# Patient Record
Sex: FEMALE | Race: WHITE | NOT HISPANIC OR LATINO | Employment: FULL TIME | URBAN - NONMETROPOLITAN AREA
[De-identification: names, ages, dates, MRNs, and addresses within clinical notes are randomized per-mention and may not be internally consistent; named-entity substitution may affect disease eponyms.]

---

## 2017-04-17 ENCOUNTER — TRANSCRIBE ORDERS (OUTPATIENT)
Dept: ADMINISTRATIVE | Facility: HOSPITAL | Age: 42
End: 2017-04-17

## 2017-04-17 ENCOUNTER — LAB (OUTPATIENT)
Dept: LAB | Facility: HOSPITAL | Age: 42
End: 2017-04-17

## 2017-04-17 DIAGNOSIS — E55.9 VITAMIN D INSUFFICIENCY: ICD-10-CM

## 2017-04-17 DIAGNOSIS — E55.9 VITAMIN D INSUFFICIENCY: Primary | ICD-10-CM

## 2017-04-17 LAB — 25(OH)D3 SERPL-MCNC: 33 NG/ML

## 2017-04-17 PROCEDURE — 82306 VITAMIN D 25 HYDROXY: CPT

## 2017-04-17 PROCEDURE — 36415 COLL VENOUS BLD VENIPUNCTURE: CPT

## 2017-04-18 ENCOUNTER — HOSPITAL ENCOUNTER (OUTPATIENT)
Dept: GENERAL RADIOLOGY | Facility: HOSPITAL | Age: 42
Discharge: HOME | End: 2017-04-18
Admitting: NURSE PRACTITIONER

## 2017-04-18 ENCOUNTER — TRANSCRIBE ORDERS (OUTPATIENT)
Dept: ADMINISTRATIVE | Facility: HOSPITAL | Age: 42
End: 2017-04-18

## 2017-04-18 DIAGNOSIS — M54.2 NECK PAIN: ICD-10-CM

## 2017-04-18 DIAGNOSIS — M54.2 NECK PAIN: Primary | ICD-10-CM

## 2017-04-18 PROCEDURE — 72050 X-RAY EXAM NECK SPINE 4/5VWS: CPT

## 2017-04-18 PROCEDURE — 72050 X-RAY EXAM NECK SPINE 4/5VWS: CPT | Performed by: RADIOLOGY

## 2017-09-19 ENCOUNTER — TRANSCRIBE ORDERS (OUTPATIENT)
Dept: ADMINISTRATIVE | Facility: HOSPITAL | Age: 42
End: 2017-09-19

## 2017-09-19 ENCOUNTER — TRANSCRIBE ORDERS (OUTPATIENT)
Dept: PHYSICAL THERAPY | Facility: HOSPITAL | Age: 42
End: 2017-09-19

## 2017-09-19 DIAGNOSIS — M54.2 CERVICALGIA: Primary | ICD-10-CM

## 2017-09-19 DIAGNOSIS — M54.2 NECK PAIN: Primary | ICD-10-CM

## 2017-09-19 DIAGNOSIS — M25.521 RIGHT ELBOW PAIN: ICD-10-CM

## 2017-09-20 ENCOUNTER — HOSPITAL ENCOUNTER (OUTPATIENT)
Dept: PHYSICAL THERAPY | Facility: HOSPITAL | Age: 42
Setting detail: THERAPIES SERIES
Discharge: HOME | End: 2017-09-20

## 2017-09-20 DIAGNOSIS — M54.2 NECK PAIN: Primary | ICD-10-CM

## 2017-09-20 DIAGNOSIS — M25.521 RIGHT ELBOW PAIN: ICD-10-CM

## 2017-09-20 PROCEDURE — 97035 APP MDLTY 1+ULTRASOUND EA 15: CPT | Performed by: PHYSICAL THERAPIST

## 2017-09-20 PROCEDURE — 97162 PT EVAL MOD COMPLEX 30 MIN: CPT | Performed by: PHYSICAL THERAPIST

## 2017-09-20 NOTE — THERAPY EVALUATION
Outpatient Physical Therapy Ortho Initial Evaluation   [place]     Patient Name: Rosie Cruz  : 1975  MRN: [id number]  Today's Date: 2017      Visit Date: 2017    There is no problem list on file for this patient.       History reviewed. No pertinent past medical history.     Past Surgical History:   Procedure Laterality Date   • CHOLECYSTECTOMY     • HYSTERECTOMY         Visit Dx:     ICD-10-CM ICD-9-CM   1. Neck pain M54.2 723.1   2. Right elbow pain M25.521 719.42             Patient History       17 1500          History    Chief Complaint Pain;Joint stiffness;Muscle tenderness;Tingling;Numbness;Difficulty with daily activities  -BE      Type of Pain Elbow pain;Neck pain  -BE      Date Current Problem(s) Began --   Right elbow pain-1 year; Neck pain-7 years  -BE      Brief Description of Current Complaint Patient reports that she has been experiencing neck pain for approximately 7 years, noting that she is unsure of mechanism of injury.  Patient reports that she has recently noticed pain worsening over the past year and started noticing headaches.  Patient reports that she began noticing pain in the right elbow for approximately 1 year; she notes that she does not recall any mechanism of injury at the elbow.  Patient reports that elbow pain is primarily on the lateral aspect of the elbow.  Patient reports that she experiences numbness/tingling in the right UE, which extends to the fingertips.    -BE      Onset Date- PT 2017  -BE      Patient/Caregiver Goals Return to prior level of function;Relieve pain  -BE      Current Tobacco Use Non-smoker  -BE      Smoking Status Non-smoker  -BE      Patient's Rating of General Health Excellent  -BE      Hand Dominance right-handed  -BE      Occupation/sports/leisure activities [employer]   -BE      Patient seeing anyone else for problem(s)? Yes  -BE      How has patient tried to help current problem? Chiropractor, OTC  medication, heat (neck)  -BE      What clinical tests have you had for this problem? X-ray;Nerve Conduction Test  -BE      Results of Clinical Tests Negative for bony abnormalities at the neck; Nerve conduction test negative for carpal tunnel  -BE      Are you or can you be pregnant No  -BE      Pain     Pain Location Elbow;Neck  -BE      Pain at Present 2  -BE      Pain at Best 2  -BE      Pain at Worst 8  -BE      Pain Frequency Constant/continuous   varies in intensity  -BE      Pain Description Aching;Dull;Sharp;Stabbing;Burning;Tiring;Numbness;Throbbing  -BE      What Performance Factors Make the Current Problem(s) WORSE? Neck movements, lifting, elbow movements  -BE      What Performance Factors Make the Current Problem(s) BETTER? OTC medication  -BE      Is your sleep disturbed? Yes  -BE      Total hours of sleep per night 6-7 hours disturbed  -BE      Difficulties at work? Patient reports difficulty with moving the mouse or writing.  -BE      Difficulties with ADL's? Patient reports that she is indpendent with ADLs, but notes that she heavily relies on the unaffected UE.  -BE      Fall Risk Assessment    Any falls in the past year: No  -BE      Number of falls reported in the last 12 months 0  -BE      Daily Activities    Primary Language English  -BE      Are you able to read Yes  -BE      Are you able to write Yes  -BE      How does patient learn best? Listening;Reading;Demonstration;Pictures/Video  -BE      Pt Participated in POC and Goals Yes  -BE      Safety    Are you being hurt, hit, or frightened by anyone at home or in your life? No  -BE      Are you being neglected by a caregiver No  -BE        User Key  (r) = Recorded By, (t) = Taken By, (c) = Cosigned By    Initials Name Provider Type    BE Casi Ren, PT Physical Therapist                PT Ortho       09/20/17 1500    Quarter Clearing    Quarter Clearing Upper Quarter Clearing  -BE    DTR- Upper Quarter Clearing    Biceps (C5/6)  Bilateral:;0- No response  -BE    Brachioradialis (C6) Bilateral:;2- Normal response  -BE    Triceps (C7) Bilateral:;2- Normal response  -BE    Sensory Screen for Light Touch- Upper Quarter Clearing    C4 (posterior shoulder) Bilateral:;Intact  -BE    C5 (lateral upper arm) Bilateral:;Intact  -BE    C6 (tip of thumb) Bilateral:;Intact  -BE    C7 (tip of 3rd finger) Right:;Diminished;Left:;Intact  -BE    C8 (tip of 5th finger) Bilateral:;Intact  -BE    T1 (medial lower arm) Bilateral:;Intact  -BE    Myotomal Screen- Upper Quarter Clearing    Shoulder flexion (C5) Left:;5 (Normal);Right:;4 (Good)  -BE    Elbow flexion/wrist extension (C6) Left:;5 (Normal);Right:;4 (Good)  -BE    Elbow extension/wrist flexion (C7) Left:;5 (Normal);Right:;4 (Good)  -BE    Cervical/Shoulder ROM Screen    Cervical flexion Impaired   40  -BE    Cervical extension Impaired   30  -BE    Cervical lateral flexion Impaired   Left-33, Right-34  -BE    Cervical rotation Impaired   Left-57, Right-55  -BE    Special Tests/Palpation    Special Tests/Palpation Cervical/Thoracic  -BE    Cervical Palpation    Suboccipital Tender  -BE    SCM Tender  -BE    Cervical Facets Tender  -BE    Scalenes Tender  -BE    Spinous Process Tender  -BE    Levator Scapula Tender;Guarded/taut  -BE    Upper Traps Tender;Guarded/taut  -BE    Middle Traps Tender  -BE    Rhomboids Tender  -BE    Lower Traps Tender  -BE    Cervical/Thoracic Special Tests    Spurlings (Foraminal Compression) Negative  -BE    Cervical Compression (Forarminal Compression vs. Facet Pain) Negative  -BE    Cervical Distraction (Foraminal Compression vs. Facet Pain) Negative  -BE    Elbow/Forearm Palpation    Lateral Epicondyle Right:;Tender  -BE    Extensor Tendon Right:;Tender  -BE    Lateral Collateral Tender  -BE    Elbow/Forearm Special Tests    Valgus Stress at 30 Degrees (UCL Lesion) Negative  -BE    Varus Stress at 30 Degrees (RCL Lesion) Negative  -BE    Medial Epicondyle Test (Golfer's  Elbow) Negative  -BE    Lateral Epicondyle Test (Tennis Elbow) Positive  -BE    Left Elbow/Forearm    Extension/Flexion ROM Details 0-140  -BE    Right Elbow/Forearm    Extension/Flexion ROM Details 5-135  -BE      User Key  (r) = Recorded By, (t) = Taken By, (c) = Cosigned By    Initials Name Provider Type    BE Casi Ren, PT Physical Therapist                            Therapy Education       09/20/17 1607          Therapy Education    Given HEP;Symptoms/condition management;Pain management;Posture/body mechanics  -BE      Program New  -BE      How Provided Verbal;Demonstration  -BE      Provided to Patient  -BE      Level of Understanding Demonstrated;Verbalized  -BE        User Key  (r) = Recorded By, (t) = Taken By, (c) = Cosigned By    Initials Name Provider Type    BE Casi Ren, PT Physical Therapist                PT OP Goals       09/20/17 1600       PT Short Term Goals    STG Date to Achieve 10/04/17  -BE     STG 1 Patient will report pain no greater than 5/10 when performing self-care activities.  -BE     STG 2 Patient will report a 25% decrease in headaches.  -BE     STG 3 Cervical ROM will improve by at least 5 degrees to allow for greater ease with ADLs.  -BE     STG 4 Right Elbow ROM will be symmetrical to unaffected elbow to allow for greater ease with ADLs.  -BE     Long Term Goals    LTG Date to Achieve 10/20/17  -BE     LTG 1 Patient will report being able to achieve at least 6 hours of undisturbed sleep.  -BE     LTG 2 Cervical ROM will improve by at least 10 degrees to allow for greater ease with ADLs.  -BE     LTG 3 Patient will report pain no greater than 3/10 when lifting items with the right UE.  -BE     LTG 4 NDI scores will improve by at least 10% to show improved functional mobility.  -BE     LTG 5 UE strength will improve to at least 4+/5 on the right.  -BE     Time Calculation    PT Goal Re-Cert Due Date 10/20/17  -BE       User Key  (r) = Recorded By, (t) = Taken By,  (c) = Cosigned By    Initials Name Provider Type    BE Casi Ren, PT Physical Therapist                PT Assessment/Plan       09/20/17 1614       PT Assessment    Functional Limitations Limitation in home management;Performance in self-care ADL;Performance in leisure activities;Limitations in community activities;Performance in work activities  -BE     Impairments Muscle strength;Sensation;Pain;Joint mobility;Posture;Range of motion  -BE     Assessment Comments Patient is a 42 year old female referred to therapy with neck pain and right elbow pain.  Patient presents with decreased cervical ROM, decreased right elbow ROM, decreased UE strength, decreased sensation, and increased pain.  Patient displayed positive special tests for lateral epicondylitis.  Special tests were negative at the cervical spine.  Patient reports a 20% functional mobility impairment, based on her response to the Neck Disability Index.  Patient will benefit from skilled PT so that she can achieve her maximum level of function.  -BE     Please refer to paper survey for additional self-reported information Yes  -BE     Rehab Potential Good  -BE     Patient/caregiver participated in establishment of treatment plan and goals Yes  -BE     Patient would benefit from skilled therapy intervention Yes  -BE     PT Plan    PT Frequency 2x/week;3x/week  -BE     Predicted Duration of Therapy Intervention (days/wks) 4 weeks  -BE     Planned CPT's? PT EVAL MOD COMPLELITY: 88277;PT RE-EVAL: 89921;PT THER PROC EA 15 MIN: 75781;PT THER ACT EA 15 MIN: 87363;PT MANUAL THERAPY EA 15 MIN: 81837;PT NEUROMUSC RE-EDUCATION EA 15 MIN: 67451;PT ELECTRICAL STIM UNATTEND: ;PT ULTRASOUND EA 15 MIN: 04417;PT THER SUPP EA 15 MIN;PT HOT/COLD PACK WC NONMCARE: 66731  -BE     PT Plan Comments Above interventions to be used to improve functional mobility.  -BE       User Key  (r) = Recorded By, (t) = Taken By, (c) = Cosigned By    Initials Name Provider Type    BE  Casi Ren, PT Physical Therapist                Modalities       09/20/17 1500          Ultrasound 42731    Location Right lateral epicondyle  -BE      Rx Minutes 8  -BE      Duty Cycle 50  -BE      Frequency --   1.2 MHz  -BE      Intensity - Wts/cm 1.2  -BE        User Key  (r) = Recorded By, (t) = Taken By, (c) = Cosigned By    Initials Name Provider Type    BE Casi Ren PT Physical Therapist              Exercises       09/20/17 1600          Exercise 1    Exercise Name 1 HEP  -BE        User Key  (r) = Recorded By, (t) = Taken By, (c) = Cosigned By    Initials Name Provider Type    BE Casi Ren PT Physical Therapist                              Outcome Measures       09/20/17 1600          Neck Disability Index    Section 1 - Pain Intensity 2  -BE      Section 2 - Personal Care 1  -BE      Section 3 - Lifting 1  -BE      Section 4 - Work 0  -BE      Section 5 - Headaches 2  -BE      Section 6 - Concentration 0  -BE      Section 7 - Sleeping 1  -BE      Section 8 - Driving 1  -BE      Section 9 - Reading 1  -BE      Section 10 - Recreation 1  -BE      Neck Disability Index Score 10  -BE      Neck Disability Index Comments 10/50=20% impairment  -BE      Functional Assessment    Outcome Measure Options Neck Disability Index (NDI)  -BE        User Key  (r) = Recorded By, (t) = Taken By, (c) = Cosigned By    Initials Name Provider Type    BE Casi Ren PT Physical Therapist            Time Calculation:   Start Time: 1458  Stop Time: 1615  Time Calculation (min): 77 min     Therapy Charges for Today     Code Description Service Date Service Provider Modifiers Qty    11308198363  PT EVAL MOD COMPLEXITY 4 9/20/2017 Casi Ren, PT GP 1    31908147045  PT ULTRASOUND EA 15 MIN 9/20/2017 Casi Ren, PT GP 1          PT G-Codes  Outcome Measure Options: Neck Disability Index (NDI)         Casi Ren PT  9/20/2017

## 2017-09-22 ENCOUNTER — HOSPITAL ENCOUNTER (OUTPATIENT)
Dept: MRI IMAGING | Facility: HOSPITAL | Age: 42
Discharge: HOME | End: 2017-09-22
Admitting: NURSE PRACTITIONER

## 2017-09-22 DIAGNOSIS — M54.2 CERVICALGIA: ICD-10-CM

## 2017-09-22 PROCEDURE — 72141 MRI NECK SPINE W/O DYE: CPT

## 2017-09-22 PROCEDURE — 72141 MRI NECK SPINE W/O DYE: CPT | Performed by: RADIOLOGY

## 2017-09-25 ENCOUNTER — HOSPITAL ENCOUNTER (OUTPATIENT)
Dept: PHYSICAL THERAPY | Facility: HOSPITAL | Age: 42
Setting detail: THERAPIES SERIES
Discharge: HOME | End: 2017-09-25

## 2017-09-25 DIAGNOSIS — M54.2 NECK PAIN: Primary | ICD-10-CM

## 2017-09-25 DIAGNOSIS — M25.521 RIGHT ELBOW PAIN: ICD-10-CM

## 2017-09-25 PROCEDURE — 97010 HOT OR COLD PACKS THERAPY: CPT | Performed by: PHYSICAL THERAPIST

## 2017-09-25 PROCEDURE — 97110 THERAPEUTIC EXERCISES: CPT | Performed by: PHYSICAL THERAPIST

## 2017-09-25 PROCEDURE — 97140 MANUAL THERAPY 1/> REGIONS: CPT | Performed by: PHYSICAL THERAPIST

## 2017-09-25 NOTE — THERAPY TREATMENT NOTE
Outpatient Physical Therapy Ortho Treatment Note   [place]     Patient Name: Rosie Cruz  : 1975  MRN: [id number]  Today's Date: 2017      Visit Date: 2017    Visit Dx:    ICD-10-CM ICD-9-CM   1. Neck pain M54.2 723.1   2. Right elbow pain M25.521 719.42       There is no problem list on file for this patient.       No past medical history on file.     Past Surgical History:   Procedure Laterality Date   • CHOLECYSTECTOMY     • HYSTERECTOMY               PT Ortho       17 1400    Subjective Comments    Subjective Comments Patient reports that she has increased pain in the elbow today, due to helping a friend move.  -BE      User Key  (r) = Recorded By, (t) = Taken By, (c) = Cosigned By    Initials Name Provider Type    BE Casi Ren, PT Physical Therapist                            PT Assessment/Plan       17 1438       PT Assessment    Assessment Comments Patient's session initiated with MH to the right elbow.  STM performed to the right elbow, with patient reporting tenderness at right lateral epicondyle.  Patient performed ther ex, per flow sheet.  Ther ex focused on stretching and ROM.  Session concluded with US followed by cryotherapy; no adverse reactions noted.  Patient will continue to be progressed per her tolerance and POC.  -BE     PT Plan    PT Plan Comments Progress per patient's tolerance and POC.  -BE       User Key  (r) = Recorded By, (t) = Taken By, (c) = Cosigned By    Initials Name Provider Type    BE Casi Ren, PT Physical Therapist                Modalities       17 1400          Moist Heat    MH Applied Yes  -BE      Location right elbow  -BE      Rx Minutes 10 mins  -BE      MH Prior to Rx Yes  -BE      MH S/P Rx --   no redness following MH  -BE      Ice    Ice Applied Yes  -BE      Location right elbow  -BE      Rx Minutes Other:   5 min  -BE      Ice S/P Rx Yes  -BE      Ultrasound 71262    Location Right lateral epicondyle  -BE       Rx Minutes 8  -BE      Duty Cycle 50  -BE      Frequency --   1.2 MHz  -BE      Intensity - Wts/cm 1.2  -BE        User Key  (r) = Recorded By, (t) = Taken By, (c) = Cosigned By    Initials Name Provider Type    ALAN Ren PT Physical Therapist                Exercises       09/25/17 1400          Subjective Comments    Subjective Comments Patient reports that she has increased pain in the elbow today, due to helping a friend move.  -BE      Exercise 1    Exercise Name 1 Cervical ROM, Levator stretch, UT stretch, wrist flexion stretch, wrist extension stretch, elbow ROM  -BE      Cueing 1 Verbal;Tactile;Demo  -BE      Time (Minutes) 1 15 min  -BE        User Key  (r) = Recorded By, (t) = Taken By, (c) = Cosigned By    Initials Name Provider Type    ALAN Ren, PT Physical Therapist                        Manual Rx (last 36 hours)      Manual Treatments       09/25/17 1300          Manual Rx 1    Manual Rx 1 Location Right elbow  -BE      Manual Rx 1 Type Soft tissue mobilization  -BE      Manual Rx 1 Grade Per patient's tolerance  -BE      Manual Rx 1 Duration 10 min  -BE        User Key  (r) = Recorded By, (t) = Taken By, (c) = Cosigned By    Initials Name Provider Type    ALAN Ren PT Physical Therapist                    Therapy Education       09/25/17 1438          Therapy Education    Given HEP;Symptoms/condition management;Pain management;Posture/body mechanics  -BE      Program Reinforced  -BE      How Provided Verbal;Demonstration  -BE      Provided to Patient  -BE      Level of Understanding Verbalized;Demonstrated  -BE        User Key  (r) = Recorded By, (t) = Taken By, (c) = Cosigned By    Initials Name Provider Type    ALAN Ren PT Physical Therapist                Time Calculation:   Start Time: 1108  Stop Time: 1200  Time Calculation (min): 52 min    Therapy Charges for Today     Code Description Service Date Service Provider Modifiers Qty     61537306065 HC PT THER PROC EA 15 MIN 9/25/2017 Casi Ren, PT GP 1    02980692330 HC PT MANUAL THERAPY EA 15 MIN 9/25/2017 Casi Ren, PT GP 1    85099862005 HC PT HOT/COLD PACK WC NONMCARE 9/25/2017 Casi Ren, PT GP 1                    Casi Ren, PT  9/25/2017

## 2017-09-28 ENCOUNTER — HOSPITAL ENCOUNTER (OUTPATIENT)
Dept: PHYSICAL THERAPY | Facility: HOSPITAL | Age: 42
Setting detail: THERAPIES SERIES
Discharge: HOME | End: 2017-09-28

## 2017-09-28 DIAGNOSIS — M25.521 RIGHT ELBOW PAIN: ICD-10-CM

## 2017-09-28 DIAGNOSIS — M54.2 NECK PAIN: Primary | ICD-10-CM

## 2017-09-28 PROCEDURE — 97010 HOT OR COLD PACKS THERAPY: CPT

## 2017-09-28 PROCEDURE — G0283 ELEC STIM OTHER THAN WOUND: HCPCS

## 2017-09-28 PROCEDURE — 97140 MANUAL THERAPY 1/> REGIONS: CPT

## 2017-09-28 PROCEDURE — 97035 APP MDLTY 1+ULTRASOUND EA 15: CPT

## 2017-09-28 PROCEDURE — 97110 THERAPEUTIC EXERCISES: CPT

## 2017-09-28 NOTE — THERAPY TREATMENT NOTE
Outpatient Physical Therapy Ortho Treatment Note   [facility]     Patient Name: Rosie Cruz  : 1975  MRN: [id number]  Today's Date: 2017      Visit Date: 2017    Visit Dx:    ICD-10-CM ICD-9-CM   1. Neck pain M54.2 723.1   2. Right elbow pain M25.521 719.42       There is no problem list on file for this patient.       No past medical history on file.     Past Surgical History:   Procedure Laterality Date   • CHOLECYSTECTOMY     • HYSTERECTOMY               PT Ortho       17 1700    Subjective Comments    Subjective Comments Patient reports increased soreness following previous session, however she states tolerable.  Pt reports 1/10 neck, and 2/10 R) elbow pain prior to tx.    -SCAR    Subjective Pain    Able to rate subjective pain? yes  -SCAR    Pre-Treatment Pain Level 2   1/10 neck, 2/10 R) elbow pain  -SCRA    Post-Treatment Pain Level 2   1/10 neck, 2/10 R) elbow  -SCAR      User Key  (r) = Recorded By, (t) = Taken By, (c) = Cosigned By    Initials Name Provider Type    SCAR Rossi, PTA Physical Therapy Assistant                            PT Assessment/Plan       17 1710       PT Assessment    Assessment Comments Patient completed today's sessoin w/ no changes in pain noted pre and post treatment.  Pt reported increased soreness following previous session, however stated tolerable.  Pt initiated today's session w/ MH and Estim to involved areas f/b therex as per written flow sheet, and manual rx.  Treatment concluded with pulsed US and cryotherapy.  Pt received cues throughout [facility] for improved feedback and for max benefit w/ activities.  Pt continues to progress w/ therapy to address goals and achieve maximum level of function.  No adverse reactions observed following modalities.   -SCAR     PT Plan    PT Plan Comments Continue with PT's POC and progress tx as tolerated  by patient.   -SCAR       User Key  (r) = Recorded By, (t) = Taken By, (c) = Cosigned By    Initials  "Name Provider Type    SCAR Rossi PTA Physical Therapy Assistant                Modalities       09/28/17 1700          Moist Heat    MH Applied Yes  -SCAR      Location R) elbow, cervical region  -SCAR      Rx Minutes 15 mins  -SCAR      MH Prior to Rx Yes   w/ estim to cervical region in seated position  -SCAR      Ice    Ice Applied Yes  -SCAR      Location right elbow  -SCAR      Rx Minutes Other:   5 min  -SCAR      Ice S/P Rx Yes  -SCAR      Ultrasound 84862    Location Right lateral epicondyle  -SCRA      Rx Minutes 8 min  -SCAR      Duty Cycle 50  -SCAR      Frequency --   3.3MHz  -SCAR      Intensity - Wts/cm 1.2  -SCAR        User Key  (r) = Recorded By, (t) = Taken By, (c) = Cosigned By    Initials Name Provider Type    SCAR Rossi PTA Physical Therapy Assistant        Estim (IFC) cervical region x 15 min w/ MH        Exercises       09/28/17 1700          Subjective Comments    Subjective Comments Patient reports increased soreness following previous session, however she states tolerable.  Pt reports 1/10 neck, and 2/10 R) elbow pain.    -SCAR      Subjective Pain    Able to rate subjective pain? yes  -SCAR      Pre-Treatment Pain Level 2   1/10 neck, 2/10 R) elbow pain  -SCAR      Post-Treatment Pain Level 2   1/10 neck, 2/10 R) elbow  -SCAR      Exercise 1    Exercise Name 1 cervical AROM x10, lev scap stretch 3x20\", UT stretch 3x20\", wrist flex stretch 3x20\", wrist ext stretch 3x20\", elbow flex/ ext (AROM) 10x2  -SCAR      Cueing 1 Verbal;Tactile;Demo  -SCAR      Time (Minutes) 1 20 min  -SCAR        User Key  (r) = Recorded By, (t) = Taken By, (c) = Cosigned By    Initials Name Provider Type    SCAR Rossi PTA Physical Therapy Assistant                        Manual Rx (last 36 hours)      Manual Treatments       09/28/17 1700          Manual Rx 1    Manual Rx 1 Location cervical musculature, R) elbow  -SCAR      Manual Rx 1 Type Soft tissue mobilization  -SCAR      Manual Rx 1 Grade as to pt's " tolerance  -SCAR      Manual Rx 1 Duration 20 min  -SCAR        User Key  (r) = Recorded By, (t) = Taken By, (c) = Cosigned By    Initials Name Provider Type    SCAR Rossi PTA Physical Therapy Assistant                    Therapy Education       09/28/17 1710          Therapy Education    Given HEP;Symptoms/condition management;Pain management;Posture/body mechanics  -SCAR      Program Reinforced  -SCAR      How Provided Verbal;Demonstration  -SCAR      Provided to Patient  -SCAR      Level of Understanding Verbalized;Demonstrated  -SCAR        User Key  (r) = Recorded By, (t) = Taken By, (c) = Cosigned By    Initials Name Provider Type    SCAR Rossi PTA Physical Therapy Assistant                Time Calculation:   Start Time: 1500  Stop Time: 1612  Time Calculation (min): 72 min    Therapy Charges for Today     Code Description Service Date Service Provider Modifiers Qty    10042532211 HC PT THER PROC EA 15 MIN 9/28/2017 Yojana Rossi PTA GP 1    60704435509 HC PT MANUAL THERAPY EA 15 MIN 9/28/2017 Yojana Rossi PTA GP 1    79680684704 HC PT ULTRASOUND EA 15 MIN 9/28/2017 Yojana Rossi PTA GP 1    91607253399 HC PT ELECTRICAL STIM UNATTENDED 9/28/2017 Yojana Rossi PTA  1    00148495116 HC PT HOT/COLD PACK WC NONMCARE 9/28/2017 Yojana Rossi PTA GP 1                    Yojana Rossi PTA  9/28/2017

## 2017-10-02 ENCOUNTER — HOSPITAL ENCOUNTER (OUTPATIENT)
Dept: PHYSICAL THERAPY | Facility: HOSPITAL | Age: 42
Setting detail: THERAPIES SERIES
Discharge: HOME | End: 2017-10-02

## 2017-10-02 DIAGNOSIS — M54.2 NECK PAIN: Primary | ICD-10-CM

## 2017-10-02 DIAGNOSIS — M25.521 RIGHT ELBOW PAIN: ICD-10-CM

## 2017-10-02 PROCEDURE — G0283 ELEC STIM OTHER THAN WOUND: HCPCS | Performed by: PHYSICAL THERAPIST

## 2017-10-02 PROCEDURE — 97140 MANUAL THERAPY 1/> REGIONS: CPT | Performed by: PHYSICAL THERAPIST

## 2017-10-02 PROCEDURE — 97010 HOT OR COLD PACKS THERAPY: CPT | Performed by: PHYSICAL THERAPIST

## 2017-10-02 PROCEDURE — 97035 APP MDLTY 1+ULTRASOUND EA 15: CPT | Performed by: PHYSICAL THERAPIST

## 2017-10-02 PROCEDURE — 97110 THERAPEUTIC EXERCISES: CPT | Performed by: PHYSICAL THERAPIST

## 2017-10-02 NOTE — THERAPY TREATMENT NOTE
Outpatient Physical Therapy Ortho Treatment Note  [facility]     Patient Name: Rosie Cruz  : 1975  MRN: [id number]  Today's Date: 10/2/2017      Visit Date: 10/02/2017    Visit Dx:    ICD-10-CM ICD-9-CM   1. Neck pain M54.2 723.1   2. Right elbow pain M25.521 719.42       There is no problem list on file for this patient.       No past medical history on file.     Past Surgical History:   Procedure Laterality Date   • CHOLECYSTECTOMY     • HYSTERECTOMY               PT Ortho       10/02/17 1700    Subjective Comments    Subjective Comments Patient states that she has minimal pain today, noting 1/10 pain.  -BE    Subjective Pain    Able to rate subjective pain? yes  -BE    Pre-Treatment Pain Level 1  -BE    Post-Treatment Pain Level 1  -BE      User Key  (r) = Recorded By, (t) = Taken By, (c) = Cosigned By    Initials Name Provider Type    BE Casi Ren, PT Physical Therapist                            PT Assessment/Plan       10/02/17 172       PT Assessment    Assessment Comments Patient tolerated today's session well, with no reports of increased pain.  Session initiated with ESTIM to the cervical region and MH to the elbow/cervical region; no adverse reactions noted.  Patient performed ther ex per flow sheet, with focus on stretching and ROM.  Patient continued to report tenderness at the lateral epicondyle.  Patient's session concluded with US followed by cryotherapy.  She will continue to be progressed per her tolerance and POC.  -BE     PT Plan    PT Plan Comments Progress per patient's tolerance and POC.  -BE       User Key  (r) = Recorded By, (t) = Taken By, (c) = Cosigned By    Initials Name Provider Type    BE Casi Ren, PT Physical Therapist                Modalities       10/02/17 170          Moist Heat    MH Applied Yes   No skin irritation following ESTIM  -BE      Location R) elbow, cervical region  -BE      Rx Minutes 10 mins  -BE      MH Prior to Rx Yes   w/ estim  "to cervical region in seated position  -BE      Ice    Ice Applied Yes  -BE      Location right elbow  -BE      Rx Minutes Other:   5 min  -BE      Ice S/P Rx Yes  -BE      Ultrasound 24654    Location Right lateral epicondyle  -BE      Rx Minutes 8 min  -BE      Duty Cycle 50  -BE      Frequency --   3.3MHz  -BE      Intensity - Wts/cm 1.2  -BE      ELECTRICAL STIMULATION    Attended/Unattended Unattended  -BE      Stimulation Type Pre-Mod  -BE      Max mAmp --   per patient's tolerance  -BE      Location/Electrode Placement/Other Cervical  -BE      Rx Minutes 10 mins  -BE        User Key  (r) = Recorded By, (t) = Taken By, (c) = Cosigned By    Initials Name Provider Type    BE Casi Ren, PT Physical Therapist                Exercises       10/02/17 1700          Subjective Comments    Subjective Comments Patient states that she has minimal pain today, noting 1/10 pain.  -BE      Subjective Pain    Able to rate subjective pain? yes  -BE      Pre-Treatment Pain Level 1  -BE      Post-Treatment Pain Level 1  -BE      Exercise 1    Exercise Name 1 cervical AROM x10, lev scap stretch 3x20\", UT stretch 3x20\", wrist flex stretch 3x20\", wrist ext stretch 3x20\", elbow flex/ ext (AROM) 10x2  -BE      Cueing 1 Verbal;Tactile;Demo  -BE      Time (Minutes) 1 20 min  -BE        User Key  (r) = Recorded By, (t) = Taken By, (c) = Cosigned By    Initials Name Provider Type    BE Casi Ren PT Physical Therapist                        Manual Rx (last 36 hours)      Manual Treatments       10/02/17 1700          Manual Rx 1    Manual Rx 1 Location Right elbow  -BE      Manual Rx 1 Type Soft tissue mobilization  -BE      Manual Rx 1 Grade as to pt's tolerance  -BE      Manual Rx 1 Duration 20 min  -BE        User Key  (r) = Recorded By, (t) = Taken By, (c) = Cosigned By    Initials Name Provider Type    ALAN Ren PT Physical Therapist                    Therapy Education       10/02/17 1720          " Therapy Education    Given HEP;Symptoms/condition management;Pain management;Posture/body mechanics  -BE      Program Reinforced  -BE      How Provided Verbal;Demonstration  -BE      Provided to Patient  -BE      Level of Understanding Verbalized;Demonstrated  -BE        User Key  (r) = Recorded By, (t) = Taken By, (c) = Cosigned By    Initials Name Provider Type    BE Casi Ren, PT Physical Therapist                Time Calculation:   Start Time: 1605  Stop Time: 1713  Time Calculation (min): 68 min    Therapy Charges for Today     Code Description Service Date Service Provider Modifiers Qty    52954792241 HC PT ELECTRICAL STIM UNATTENDED 10/2/2017 Casi Ren, PT  1    09672221977 HC PT HOT/COLD PACK WC NONMCARE 10/2/2017 Casi Ren, PT GP 1    03874295000 HC PT THER PROC EA 15 MIN 10/2/2017 Casi Ren, PT GP 1    91009703791 HC PT MANUAL THERAPY EA 15 MIN 10/2/2017 Casi Ren, PT GP 1    49173035872 HC PT ULTRASOUND EA 15 MIN 10/2/2017 Casi Ren, PT GP 1                    Casi Ren, PT  10/2/2017

## 2017-10-05 PROCEDURE — G0283 ELEC STIM OTHER THAN WOUND: HCPCS | Performed by: PHYSICAL THERAPIST

## 2017-10-05 PROCEDURE — 97140 MANUAL THERAPY 1/> REGIONS: CPT | Performed by: PHYSICAL THERAPIST

## 2017-10-05 PROCEDURE — 97035 APP MDLTY 1+ULTRASOUND EA 15: CPT | Performed by: PHYSICAL THERAPIST

## 2017-10-05 PROCEDURE — 97110 THERAPEUTIC EXERCISES: CPT | Performed by: PHYSICAL THERAPIST

## 2017-10-05 PROCEDURE — 97010 HOT OR COLD PACKS THERAPY: CPT | Performed by: PHYSICAL THERAPIST

## 2017-10-13 ENCOUNTER — HOSPITAL ENCOUNTER (OUTPATIENT)
Dept: PHYSICAL THERAPY | Facility: HOSPITAL | Age: 42
Setting detail: THERAPIES SERIES
Discharge: HOME | End: 2017-10-13

## 2017-10-13 DIAGNOSIS — M54.2 NECK PAIN: Primary | ICD-10-CM

## 2017-10-13 DIAGNOSIS — M25.521 RIGHT ELBOW PAIN: ICD-10-CM

## 2017-10-13 PROCEDURE — 97140 MANUAL THERAPY 1/> REGIONS: CPT

## 2017-10-13 PROCEDURE — 97010 HOT OR COLD PACKS THERAPY: CPT

## 2017-10-13 PROCEDURE — 97110 THERAPEUTIC EXERCISES: CPT

## 2017-10-13 PROCEDURE — G0283 ELEC STIM OTHER THAN WOUND: HCPCS

## 2017-10-13 PROCEDURE — 97035 APP MDLTY 1+ULTRASOUND EA 15: CPT

## 2017-10-13 NOTE — THERAPY TREATMENT NOTE
Outpatient Physical Therapy Ortho Treatment Note   [facility]     Patient Name: Rosie Cruz  : 1975  MRN: [id number]  Today's Date: 10/13/2017      Visit Date: 10/13/2017    Visit Dx:    ICD-10-CM ICD-9-CM   1. Neck pain M54.2 723.1   2. Right elbow pain M25.521 719.42       There is no problem list on file for this patient.       No past medical history on file.     Past Surgical History:   Procedure Laterality Date   • CHOLECYSTECTOMY     • HYSTERECTOMY               PT Ortho       10/13/17 1200    Subjective Comments    Subjective Comments Patient arrives to therapy w/ reports of 6/10 R) elbow, 1/10 neck pain.  Pt states she feels her elbow is bothering her from helping her mother this week.   -SCAR    Subjective Pain    Able to rate subjective pain? yes  -SCAR    Pre-Treatment Pain Level 6   6/10 R) elbow pain, 1/10 neck  -SCAR    Post-Treatment Pain Level 2   2/10 R) elbow, 1/10 cervical  -SCAR      User Key  (r) = Recorded By, (t) = Taken By, (c) = Cosigned By    Initials Name Provider Type    SCAR Rossi PTA Physical Therapy Assistant                            PT Assessment/Plan       10/13/17 1226       PT Assessment    Assessment Comments Patient tolerated treatment well today w/ reports of decreased pain at conclusion of session.  Pt initiated today's tx w/ MH and Estim to cervical region, MH to R) elbow, for pain control f/b therex as per written flow sheet.  Treatment concluded with manual STM to R) elbow and cryotherapy.  Pt received cues as needed throughout session for improved mechanics.  No adverse reactions observed following modalities.   -SCAR     PT Plan    PT Plan Comments Continue with PT's POC and progress tx as tolerated by patient.   -SCAR       User Key  (r) = Recorded By, (t) = Taken By, (c) = Cosigned By    Initials Name Provider Type    SCAR Rossi PTA Physical Therapy Assistant                Modalities       10/13/17 1200          Moist Heat    MH Applied  "Yes  -SCAR      Location R) elbow, cervical region  -SCAR      Rx Minutes 10 mins  -SCAR      MH Prior to Rx Yes   w/ estim to cervical region in seated position  -SCAR      Ice    Ice Applied Yes  -SCAR      Location right elbow  -SCAR      Rx Minutes Other:   5 min  -SCAR      Ice S/P Rx Yes  -SCAR      Ultrasound 60955    Location Right lateral epicondyle  -SCAR      Rx Minutes 8 min  -SCAR      Duty Cycle 50  -SCAR      Frequency --   3.3MHz  -SCAR      Intensity - Wts/cm 1.2  -SCAR      Phonophoresis Yes  -SCAR      ELECTRICAL STIMULATION    Attended/Unattended Unattended  -SCAR      Stimulation Type Pre-Mod  -SCAR      Max mAmp --   as to pt's tolerance  -SCAR      Location/Electrode Placement/Other Cervical  -SCAR      Rx Minutes 10 mins  -SCAR        User Key  (r) = Recorded By, (t) = Taken By, (c) = Cosigned By    Initials Name Provider Type    SCAR Rossi PTA Physical Therapy Assistant                Exercises       10/13/17 1200          Subjective Comments    Subjective Comments Patient arrives to therapy w/ reports of 6/10 R) elbow, 1/10 neck pain prior to tx.  Pt states she feels her elbow is bothering her from helping her mother this week.   -SCAR      Subjective Pain    Able to rate subjective pain? yes  -SCAR      Pre-Treatment Pain Level 6   6/10 R) elbow pain, 1/10 neck  -SCAR      Post-Treatment Pain Level 2   2/10 R) elbow, 1/10 cervical  -SCAR      Exercise 1    Exercise Name 1 Levator stretch 3x20\", UT stretch 3x20\", wrist flexion stretch 3x20\", wrist extension stretch 3x20\"  -SCAR      Cueing 1 Verbal;Tactile;Demo  -SCAR      Time (Minutes) 1 20 min  -SCAR        User Key  (r) = Recorded By, (t) = Taken By, (c) = Cosigned By    Initials Name Provider Type    SCAR Rossi PTA Physical Therapy Assistant                        Manual Rx (last 36 hours)      Manual Treatments       10/13/17 1100          Manual Rx 1    Manual Rx 1 Location Right elbow  -SCAR      Manual Rx 1 Type Soft tissue mobilization  -SCAR      Manual " Rx 1 Grade as to pt's tolerance  -SCAR      Manual Rx 1 Duration 10 min  -SCAR        User Key  (r) = Recorded By, (t) = Taken By, (c) = Cosigned By    Initials Name Provider Type    SCAR Rossi PTA Physical Therapy Assistant                    Therapy Education       10/13/17 1226          Therapy Education    Given HEP;Symptoms/condition management;Pain management;Posture/body mechanics  -SCAR      Program Reinforced  -SCAR      How Provided Verbal;Demonstration  -SCAR      Provided to Patient  -SCAR      Level of Understanding Verbalized;Demonstrated  -SCAR        User Key  (r) = Recorded By, (t) = Taken By, (c) = Cosigned By    Initials Name Provider Type    SCAR Rossi PTA Physical Therapy Assistant                Time Calculation:   Start Time: 1120  Stop Time: 1215  Time Calculation (min): 55 min    Therapy Charges for Today     Code Description Service Date Service Provider Modifiers Qty    39418169120 HC PT THER PROC EA 15 MIN 10/13/2017 Yojana Rossi PTA GP 1    46534428689 HC PT MANUAL THERAPY EA 15 MIN 10/13/2017 Yojana Rossi PTA GP 1    57959420961 HC PT ULTRASOUND EA 15 MIN 10/13/2017 Yojana Rossi PTA GP 1    56235096196 HC PT ELECTRICAL STIM UNATTENDED 10/13/2017 Yojana Rossi PTA  1    51632580573 HC PT HOT/COLD PACK WC NONMCARE 10/13/2017 Yojana Rossi PTA GP 1                    Yojana Rossi PTA  10/13/2017

## 2017-10-17 ENCOUNTER — HOSPITAL ENCOUNTER (OUTPATIENT)
Dept: PHYSICAL THERAPY | Facility: HOSPITAL | Age: 42
Setting detail: THERAPIES SERIES
Discharge: HOME | End: 2017-10-17

## 2017-10-17 DIAGNOSIS — M54.2 NECK PAIN: Primary | ICD-10-CM

## 2017-10-17 DIAGNOSIS — M25.521 RIGHT ELBOW PAIN: ICD-10-CM

## 2017-10-17 PROCEDURE — 97140 MANUAL THERAPY 1/> REGIONS: CPT | Performed by: PHYSICAL THERAPIST

## 2017-10-17 PROCEDURE — 97035 APP MDLTY 1+ULTRASOUND EA 15: CPT | Performed by: PHYSICAL THERAPIST

## 2017-10-17 PROCEDURE — 97010 HOT OR COLD PACKS THERAPY: CPT | Performed by: PHYSICAL THERAPIST

## 2017-10-17 PROCEDURE — 97110 THERAPEUTIC EXERCISES: CPT | Performed by: PHYSICAL THERAPIST

## 2017-10-17 PROCEDURE — G0283 ELEC STIM OTHER THAN WOUND: HCPCS | Performed by: PHYSICAL THERAPIST

## 2017-10-17 NOTE — THERAPY RE-EVALUATION
Outpatient Physical Therapy Ortho Re-Evaluation   [facility]     Patient Name: Rosie Cruz  : 1975  MRN: [id number]  Today's Date: 10/17/2017      Visit Date: 10/17/2017    There is no problem list on file for this patient.       No past medical history on file.     Past Surgical History:   Procedure Laterality Date   • CHOLECYSTECTOMY     • HYSTERECTOMY         Visit Dx:     ICD-10-CM ICD-9-CM   1. Neck pain M54.2 723.1   2. Right elbow pain M25.521 719.42                 PT Ortho       10/17/17 1600    Subjective Comments    Subjective Comments Patient reports that she has noticed improvements in neck pain; however, she continues to have right elbow pain.  -BE    Subjective Pain    Able to rate subjective pain? yes  -BE    Pre-Treatment Pain Level 3  -BE    Post-Treatment Pain Level 2  -BE    Myotomal Screen- Upper Quarter Clearing    Shoulder flexion (C5) Left:;5 (Normal);Right:;4+ (Good +)  -BE    Elbow flexion/wrist extension (C6) Left:;5 (Normal);Right:;4+ (Good +)  -BE    Elbow extension/wrist flexion (C7) Left:;5 (Normal);Right:;4+ (Good +)  -BE    Cervical/Shoulder ROM Screen    Cervical flexion Impaired   40  -BE    Cervical extension Impaired   38  -BE    Cervical lateral flexion Impaired   Left-36, Right-40  -BE    Cervical rotation Impaired   Left-70, Right-75  -BE    Left Elbow/Forearm    Extension/Flexion ROM Details 3-137  -BE      User Key  (r) = Recorded By, (t) = Taken By, (c) = Cosigned By    Initials Name Provider Type    BE Casi Ren, PT Physical Therapist                            Therapy Education       10/17/17 1642          Therapy Education    Given HEP;Symptoms/condition management;Pain management;Posture/body mechanics  -BE      Program Reinforced  -BE      How Provided Verbal;Demonstration  -BE      Provided to Patient  -BE      Level of Understanding Verbalized;Demonstrated  -BE        User Key  (r) = Recorded By, (t) = Taken By, (c) = Cosigned By    Initials Name  Provider Type    BE Casi Ren, PT Physical Therapist                PT OP Goals       10/17/17 1650 10/17/17 1600    PT Short Term Goals    STG 1  Patient will report pain no greater than 5/10 when performing self-care activities.  -BE    STG 1 Progress  Met  -BE    STG 1 Progress Comments  4/10  -BE    STG 2  Patient will report a 25% decrease in headaches.  -BE    STG 2 Progress  Met  -BE    STG 3  Cervical ROM will improve by at least 5 degrees to allow for greater ease with ADLs.  -BE    STG 3 Progress  Partially Met  -BE    STG 4  Right Elbow ROM will be symmetrical to unaffected elbow to allow for greater ease with ADLs.  -BE    STG 4 Progress  Progressing  -BE    Long Term Goals    LTG 1  Patient will report being able to achieve at least 6 hours of undisturbed sleep.  -BE    LTG 1 Progress  Progressing  -BE    LTG 2  Cervical ROM will improve by at least 10 degrees to allow for greater ease with ADLs.  -BE    LTG 2 Progress  Progressing  -BE    LTG 3  Patient will report pain no greater than 3/10 when lifting items with the right UE.  -BE    LTG 3 Progress  Not Met  -BE    LTG 3 Progress Comments  8/10  -BE    LTG 4  NDI scores will improve by at least 10% to show improved functional mobility.  -BE    LTG 4 Progress  Met  -BE    LTG 5  UE strength will improve to at least 4+/5 on the right.  -BE    LTG 5 Progress  Met  -BE    Time Calculation    PT Goal Re-Cert Due Date 11/16/17  -BE       User Key  (r) = Recorded By, (t) = Taken By, (c) = Cosigned By    Initials Name Provider Type    BE Casi Ren, PT Physical Therapist                PT Assessment/Plan       10/17/17 1600       PT Assessment    Functional Limitations Limitation in home management;Performance in self-care ADL;Performance in leisure activities;Limitations in community activities;Performance in work activities  -BE     Impairments Muscle strength;Sensation;Pain;Joint mobility;Posture;Range of motion  -BE     Assessment  Comments Patient is continuing to make improvements towards goals through skilled therapy.  Patient reports 2/10 pain at best and 8/10 pain at worst.  She notes 4/10 pain with self-care activities; she reports that she experiences 8/10 pain when lifting.  Patient notes that she has noticed significant improvements in neck pain and reports a 90% improvement in headaches.  Point tenderness continues to be present at lateral epicondyle.  She reports a 6% functional mobility impairment on the NDI, which is a 14% improvement since start of care.  Patient will continue to benefit from skilled PT so that she can achieve her maximum level of function.  -BE     Rehab Potential Good  -BE     Patient/caregiver participated in establishment of treatment plan and goals Yes  -BE     Patient would benefit from skilled therapy intervention Yes  -BE     PT Plan    PT Frequency 2x/week;3x/week  -BE     Predicted Duration of Therapy Intervention (days/wks) 4 weeks  -BE     Planned CPT's? PT RE-EVAL: 83566;PT THER ACT EA 15 MIN: 95867;PT THER PROC EA 15 MIN: 39699;PT MANUAL THERAPY EA 15 MIN: 90920;PT NEUROMUSC RE-EDUCATION EA 15 MIN: 28148;PT ELECTRICAL STIM UNATTEND: ;PT ULTRASOUND EA 15 MIN: 91328;PT HOT/COLD PACK WC NONMCARE: 55779;PT THER SUPP EA 15 MIN  -BE     PT Plan Comments Progress per patient's tolerance and POC.  -BE       User Key  (r) = Recorded By, (t) = Taken By, (c) = Cosigned By    Initials Name Provider Type    BE Casi Ren, PT Physical Therapist                Modalities       10/17/17 1600          Moist Heat    MH Applied Yes  -BE      Location R) elbow, cervical region  -BE      Rx Minutes 10 mins  -BE      MH Prior to Rx Yes   w/ estim to cervical region in seated position  -BE      Ice    Ice Applied Yes  -BE      Location right elbow  -BE      Rx Minutes Other:   6 min  -BE      Ice S/P Rx Yes  -BE      Ultrasound 72597    Location Right lateral epicondyle  -BE      Rx Minutes 8 min  -BE       Duty Cycle 50  -BE      Frequency --   3.3MHz  -BE      Intensity - Wts/cm 1.2  -BE      Phonophoresis Yes  -BE      ELECTRICAL STIMULATION    Attended/Unattended Unattended  -BE      Stimulation Type Pre-Mod  -BE      Max mAmp --   as to pt's tolerance  -BE      Location/Electrode Placement/Other Cervical  -BE      Rx Minutes 10 mins  -BE        User Key  (r) = Recorded By, (t) = Taken By, (c) = Cosigned By    Initials Name Provider Type    BE Casi Ren PT Physical Therapist              Exercises       10/17/17 1600          Subjective Comments    Subjective Comments Patient reports that she has noticed improvements in neck pain; however, she continues to have right elbow pain.  -BE      Subjective Pain    Able to rate subjective pain? yes  -BE      Pre-Treatment Pain Level 3  -BE      Post-Treatment Pain Level 2  -BE      Exercise 1    Exercise Name 1 Cervical ROM, levator stretch, UT stretch, wrist flexion stretch, wrist extension stretch, elbow ROM.  -BE      Cueing 1 Verbal;Tactile  -BE      Time (Minutes) 1 20 min  -BE        User Key  (r) = Recorded By, (t) = Taken By, (c) = Cosigned By    Initials Name Provider Type    BE Casi Ren PT Physical Therapist           Manual Rx (last 36 hours)      Manual Treatments       10/17/17 1500          Manual Rx 1    Manual Rx 1 Location Right elbow  -BE      Manual Rx 1 Type Soft tissue mobilization  -BE      Manual Rx 1 Grade as to pt's tolerance  -BE        User Key  (r) = Recorded By, (t) = Taken By, (c) = Cosigned By    Initials Name Provider Type    BE Casi Ren PT Physical Therapist                            Time Calculation:   Start Time: 1500  Stop Time: 1620  Time Calculation (min): 80 min     Therapy Charges for Today     Code Description Service Date Service Provider Modifiers Qty    91520211384 HC PT ELECTRICAL STIM UNATTENDED 10/17/2017 Csai Ren PT  1    01138331064 HC PT HOT/COLD PACK WC NONMCARE 10/17/2017  Casi Ren, PT GP 1    48812705223 HC PT MANUAL THERAPY EA 15 MIN 10/17/2017 Casi Ren, PT GP 1    53092074245 HC PT THER PROC EA 15 MIN 10/17/2017 Casi Ren, PT GP 2    10345900531 HC PT ULTRASOUND EA 15 MIN 10/17/2017 Casi Ren, PT GP 1                    Casi Ren, PT  10/17/2017

## 2017-10-19 ENCOUNTER — HOSPITAL ENCOUNTER (OUTPATIENT)
Dept: PHYSICAL THERAPY | Facility: HOSPITAL | Age: 42
Setting detail: THERAPIES SERIES
Discharge: HOME | End: 2017-10-19

## 2017-10-19 DIAGNOSIS — M54.2 NECK PAIN: Primary | ICD-10-CM

## 2017-10-19 DIAGNOSIS — M25.521 RIGHT ELBOW PAIN: ICD-10-CM

## 2017-10-19 PROCEDURE — 97110 THERAPEUTIC EXERCISES: CPT

## 2017-10-19 PROCEDURE — 97140 MANUAL THERAPY 1/> REGIONS: CPT

## 2017-10-19 PROCEDURE — G0283 ELEC STIM OTHER THAN WOUND: HCPCS

## 2017-10-19 PROCEDURE — 97010 HOT OR COLD PACKS THERAPY: CPT

## 2017-10-19 PROCEDURE — 97035 APP MDLTY 1+ULTRASOUND EA 15: CPT

## 2017-10-19 NOTE — THERAPY TREATMENT NOTE
Outpatient Physical Therapy Ortho Treatment Note   [facility]     Patient Name: Rosie Cruz  : 1975  MRN: [id number]  Today's Date: 10/19/2017      Visit Date: 10/19/2017    Visit Dx:    ICD-10-CM ICD-9-CM   1. Neck pain M54.2 723.1   2. Right elbow pain M25.521 719.42       There is no problem list on file for this patient.       No past medical history on file.     Past Surgical History:   Procedure Laterality Date   • CHOLECYSTECTOMY     • HYSTERECTOMY               PT Ortho       10/19/17 1600    Subjective Comments    Subjective Comments Patient states of more pain in her R) elbow compared to her neck.  -AC    Subjective Pain    Able to rate subjective pain? yes  -AC    Pre-Treatment Pain Level 1   1/10 neck, 4/10 R) elbow  -AC    Post-Treatment Pain Level 1   1/10 neck, 10 R) elbow  -AC      10/17/17 1600    Subjective Comments    Subjective Comments Patient reports that she has noticed improvements in neck pain; however, she continues to have right elbow pain.  -BE    Subjective Pain    Able to rate subjective pain? yes  -BE    Pre-Treatment Pain Level 3  -BE    Post-Treatment Pain Level 2  -BE    Myotomal Screen- Upper Quarter Clearing    Shoulder flexion (C5) Left:;5 (Normal);Right:;4+ (Good +)  -BE    Elbow flexion/wrist extension (C6) Left:;5 (Normal);Right:;4+ (Good +)  -BE    Elbow extension/wrist flexion (C7) Left:;5 (Normal);Right:;4+ (Good +)  -BE    Cervical/Shoulder ROM Screen    Cervical flexion Impaired   40  -BE    Cervical extension Impaired   38  -BE    Cervical lateral flexion Impaired   Left-36, Right-40  -BE    Cervical rotation Impaired   Left-70, Right-75  -BE    Left Elbow/Forearm    Extension/Flexion ROM Details 3-137  -BE      User Key  (r) = Recorded By, (t) = Taken By, (c) = Cosigned By    Initials Name Provider Type    DEE Vivar, PTA Physical Therapy Assistant    BE Casi Ren PT Physical Therapist                            PT Assessment/Plan        10/19/17 1611       PT Assessment    Assessment Comments Patient tolerated treatment session well with rest breaks taken as needed by the patient. Educated patient to perform ther ex per her tolerance, patient verbalized understanding. STM performed on R) lateral epicondyle due to increase soreness in R) elbow. No adverse reactions with modalities or treatment.  -AC     PT Plan    PT Plan Comments Continue per PT's POC, progress per the patient's tolerance.  -AC       User Key  (r) = Recorded By, (t) = Taken By, (c) = Cosigned By    Initials Name Provider Type    DEE Vivar PTA Physical Therapy Assistant                Modalities       10/19/17 1600          Moist Heat    MH Applied Yes   No redness noted following moist heat  -AC      Location R) elbow, cervical region  -AC      Rx Minutes 15 mins  -AC      MH Prior to Rx Yes  -AC      Ice    Ice Applied Yes  -AC      Location right elbow  -AC      Rx Minutes Other:   6 minutes  -AC      Ice S/P Rx Yes  -AC      Ultrasound 37788    Location Right lateral epicondyle  -AC      Rx Minutes 8 min  -AC      Duty Cycle 50  -AC      Frequency --   3.3MHz  -AC      Intensity - Wts/cm 1.2  -AC      Phonophoresis Yes  -AC      ELECTRICAL STIMULATION    Attended/Unattended Unattended   No irritation noted following estim  -AC      Stimulation Type Pre-Mod  -AC      Max mAmp --   per the patient's tolerance  -AC      Location/Electrode Placement/Other Cervical  -AC      Rx Minutes 15 mins  -AC        User Key  (r) = Recorded By, (t) = Taken By, (c) = Cosigned By    Initials Name Provider Type    DEE Vivar PTA Physical Therapy Assistant                Exercises       10/19/17 1600          Subjective Comments    Subjective Comments Patient states of more pain in her R) elbow compared to her neck.  -AC      Subjective Pain    Able to rate subjective pain? yes  -AC      Pre-Treatment Pain Level 1   1/10 neck, 4/10 R) elbow  -AC       Post-Treatment Pain Level 1   1/10 neck, 4/10 R) elbow  -AC      Exercise 1    Exercise Name 1 Cervical ROM, levator stretch, UT stretch, wrist flexion stretch, wrist extension stretch, elbow ROM.  -AC      Cueing 1 Verbal;Tactile  -AC      Time (Minutes) 1 20 min  -AC        User Key  (r) = Recorded By, (t) = Taken By, (c) = Cosigned By    Initials Name Provider Type     Cynthia Vivar PTA Physical Therapy Assistant                        Manual Rx (last 36 hours)      Manual Treatments       10/19/17 1500          Manual Rx 1    Manual Rx 1 Location Right elbow  -AC      Manual Rx 1 Type Soft tissue mobilization  -AC      Manual Rx 1 Grade as to pt's tolerance  -AC      Manual Rx 1 Duration 10 min  -AC        User Key  (r) = Recorded By, (t) = Taken By, (c) = Cosigned By    Initials Name Provider Type     Cynthia Vivar PTA Physical Therapy Assistant                    Therapy Education       10/19/17 1611          Therapy Education    Given HEP;Pain management;Symptoms/condition management  -AC      Program Reinforced  -AC      How Provided Verbal;Demonstration  -AC      Provided to Patient  -AC      Level of Understanding Verbalized;Demonstrated  -AC        User Key  (r) = Recorded By, (t) = Taken By, (c) = Cosigned By    Initials Name Provider Type     Cynthia Vivar PTA Physical Therapy Assistant                Time Calculation:   Start Time: 1505  Stop Time: 1605  Time Calculation (min): 60 min    Therapy Charges for Today     Code Description Service Date Service Provider Modifiers Qty    23036526016 HC PT THER PROC EA 15 MIN 10/19/2017 Cynthia Vivar PTA GP 1    02153938968 HC PT MANUAL THERAPY EA 15 MIN 10/19/2017 Cynthia Vivar PTA GP 1    67237184287 HC PT ELECTRICAL STIM UNATTENDED 10/19/2017 Cynthia Vivar PTA  1    90664348988 HC PT HOT/COLD PACK WC NONMCARE 10/19/2017 Cynthia Vivar PTA GP 1    18287647724 HC PT ULTRASOUND EA 15 MIN  10/19/2017 Cynthia Vivar, PTA GP 1                    Cynthia Vivar, PTA  10/19/2017

## 2017-10-20 ENCOUNTER — TRANSCRIBE ORDERS (OUTPATIENT)
Dept: ADMINISTRATIVE | Facility: HOSPITAL | Age: 42
End: 2017-10-20

## 2017-10-20 ENCOUNTER — LAB (OUTPATIENT)
Dept: LAB | Facility: HOSPITAL | Age: 42
End: 2017-10-20

## 2017-10-20 DIAGNOSIS — R53.81 MALAISE: ICD-10-CM

## 2017-10-20 DIAGNOSIS — E55.9 VITAMIN D DEFICIENCY: Primary | ICD-10-CM

## 2017-10-20 DIAGNOSIS — E78.5 HYPERLIPIDEMIA, UNSPECIFIED HYPERLIPIDEMIA TYPE: ICD-10-CM

## 2017-10-20 DIAGNOSIS — E55.9 VITAMIN D DEFICIENCY: ICD-10-CM

## 2017-10-20 LAB
25(OH)D3 SERPL-MCNC: 30 NG/ML
CHOLEST SERPL-MCNC: 227 MG/DL (ref 0–200)
HDLC SERPL-MCNC: 68 MG/DL (ref 60–100)
LDLC SERPL CALC-MCNC: 138 MG/DL (ref 0–100)
LDLC/HDLC SERPL: 2.03 {RATIO}
T4 SERPL-MCNC: 8.1 MCG/DL (ref 4.5–10.9)
TRIGL SERPL-MCNC: 105 MG/DL (ref 0–150)
TSH SERPL DL<=0.05 MIU/L-ACNC: 0.99 MIU/ML (ref 0.55–4.78)
VLDLC SERPL-MCNC: 21 MG/DL

## 2017-10-20 PROCEDURE — 84436 ASSAY OF TOTAL THYROXINE: CPT | Performed by: NURSE PRACTITIONER

## 2017-10-20 PROCEDURE — 36415 COLL VENOUS BLD VENIPUNCTURE: CPT

## 2017-10-20 PROCEDURE — 84443 ASSAY THYROID STIM HORMONE: CPT | Performed by: NURSE PRACTITIONER

## 2017-10-20 PROCEDURE — 82306 VITAMIN D 25 HYDROXY: CPT | Performed by: NURSE PRACTITIONER

## 2017-10-20 PROCEDURE — 80061 LIPID PANEL: CPT | Performed by: NURSE PRACTITIONER

## 2017-10-23 ENCOUNTER — HOSPITAL ENCOUNTER (OUTPATIENT)
Dept: PHYSICAL THERAPY | Facility: HOSPITAL | Age: 42
Setting detail: THERAPIES SERIES
Discharge: HOME | End: 2017-10-23

## 2017-10-23 DIAGNOSIS — M25.521 RIGHT ELBOW PAIN: ICD-10-CM

## 2017-10-23 DIAGNOSIS — M54.2 NECK PAIN: Primary | ICD-10-CM

## 2017-10-23 PROCEDURE — 97110 THERAPEUTIC EXERCISES: CPT | Performed by: PHYSICAL THERAPIST

## 2017-10-23 PROCEDURE — 97010 HOT OR COLD PACKS THERAPY: CPT | Performed by: PHYSICAL THERAPIST

## 2017-10-23 PROCEDURE — 97140 MANUAL THERAPY 1/> REGIONS: CPT | Performed by: PHYSICAL THERAPIST

## 2017-10-23 PROCEDURE — G0283 ELEC STIM OTHER THAN WOUND: HCPCS | Performed by: PHYSICAL THERAPIST

## 2017-10-23 PROCEDURE — 97035 APP MDLTY 1+ULTRASOUND EA 15: CPT | Performed by: PHYSICAL THERAPIST

## 2017-10-23 NOTE — PROGRESS NOTES
"    Outpatient Physical Therapy Ortho Treatment Note   [place]     Patient Name: Rosie Cruz  : 1975  MRN: [id number]  Today's Date: 10/23/2017      Visit Date: 10/23/2017    Visit Dx:    ICD-10-CM ICD-9-CM   1. Neck pain M54.2 723.1   2. Right elbow pain M25.521 719.42       There is no problem list on file for this patient.       No past medical history on file.     Past Surgical History:   Procedure Laterality Date   • CHOLECYSTECTOMY     • HYSTERECTOMY               PT Ortho       10/23/17 1500    Subjective Comments    Subjective Comments The patient states her neck pain is improving, and the majority of the pain is now in the right elbow.  -AD    Subjective Pain    Able to rate subjective pain? yes  -AD    Pre-Treatment Pain Level --   Neck: 1/10, elbow: 2/10  -AD    Post-Treatment Pain Level 1  -AD      User Key  (r) = Recorded By, (t) = Taken By, (c) = Cosigned By    Initials Name Provider Type    AD Ashley Claudene Dalton, PT Physical Therapist                            PT Assessment/Plan       10/23/17 1603       PT Assessment    Assessment Comments The patient was progressed with therapeutic exercises, including therabar twists and stress ball squeezes, with reports of \"fatigue\" but not \"pain\". No redness was observed following modalities performed at the beginning and end of the session. STM addressed muscle tightness and sorness of the right lateral epicondyle. She will be progressed as tolerated. Yojana Rossi PTA assisted with today's session.  -AD     PT Plan    PT Plan Comments Progress as tolerated per POC.  -AD       User Key  (r) = Recorded By, (t) = Taken By, (c) = Cosigned By    Initials Name Provider Type    AD Ashley Claudene Dalton, PT Physical Therapist                Modalities       10/23/17 1500          Moist Heat    Location R) elbow, cervical region  -AD      Rx Minutes 10 mins  -AD      MH Prior to Rx Yes  -AD      Ice    Location right elbow  -AD      Rx Minutes Other: "   5 minutes  -AD      Ice S/P Rx Yes  -AD      Ultrasound [id number]    Location Right lateral epicondyle  -AD      Rx Minutes 8 min  -AD      Duty Cycle 50  -AD      Frequency --   3.3MHz  -AD      Intensity - Wts/cm 1.2  -AD      Phonophoresis Yes  -AD      ELECTRICAL STIMULATION    Attended/Unattended Unattended   No irritation noted following estim  -AD      Stimulation Type Pre-Mod  -AD      Max mAmp --   per the patient's tolerance  -AD      Location/Electrode Placement/Other Cervical  -AD      Rx Minutes 10 mins  -AD        User Key  (r) = Recorded By, (t) = Taken By, (c) = Cosigned By    Initials Name Provider Type    AD Ashley Claudene Dalton, PT Physical Therapist                Exercises       10/23/17 1500          Subjective Comments    Subjective Comments The patient states her neck pain is improving, and the majority of the pain is now in the right elbow.  -AD      Subjective Pain    Able to rate subjective pain? yes  -AD      Pre-Treatment Pain Level --   Neck: 1/10, elbow: 2/10  -AD      Post-Treatment Pain Level 1  -AD      Exercise 1    Exercise Name 1 Levator stretch, UT stretch, cervical ROM, wrist flexion stretch, wrist extension stretch, elbow ROM, therabar twists, stress ball squeeze, pronation/supination.  -AD      Cueing 1 Verbal;Tactile  -AD      Time (Minutes) 1 20 minutes  -AD        User Key  (r) = Recorded By, (t) = Taken By, (c) = Cosigned By    Initials Name Provider Type    AD Ashley Claudene Dalton, PT Physical Therapist                        Manual Rx (last 36 hours)      Manual Treatments       10/23/17 1500          Manual Rx 1    Manual Rx 1 Location Right elbow  -AD      Manual Rx 1 Type Soft tissue mobilization  -AD      Manual Rx 1 Grade as to pt's tolerance  -AD      Manual Rx 1 Duration 15 min  -AD        User Key  (r) = Recorded By, (t) = Taken By, (c) = Cosigned By    Initials Name Provider Type    AD Ashley Claudene Dalton, PT Physical Therapist                    Therapy  Education       10/23/17 1603          Therapy Education    Given HEP;Pain management;Symptoms/condition management  -AD      Program Reinforced  -AD      How Provided Verbal;Demonstration  -AD      Provided to Patient  -AD      Level of Understanding Verbalized;Demonstrated  -AD        User Key  (r) = Recorded By, (t) = Taken By, (c) = Cosigned By    Initials Name Provider Type    AD Ashley Claudene Dalton, PT Physical Therapist                Time Calculation:   Start Time: 1505  Stop Time: 1610  Time Calculation (min): 65 min    Therapy Charges for Today     Code Description Service Date Service Provider Modifiers Qty    95522809294 HC PT ELECTRICAL STIM UNATTENDED 10/23/2017 Ashley Claudene Dalton, PT  1    77753790795 HC PT HOT/COLD PACK WC NONMCARE 10/23/2017 Ashley Claudene Dalton, PT GP 1    99363752914 HC PT MANUAL THERAPY EA 15 MIN 10/23/2017 Ashley Claudene Dalton, PT GP 1    69583289185 HC PT THER PROC EA 15 MIN 10/23/2017 Ashley Claudene Dalton, PT GP 1    79569127974 HC PT ULTRASOUND EA 15 MIN 10/23/2017 Ashley Claudene Dalton, PT GP 1                    Ashley Claudene Dalton, PT  10/23/2017

## 2017-10-26 ENCOUNTER — HOSPITAL ENCOUNTER (OUTPATIENT)
Dept: PHYSICAL THERAPY | Facility: HOSPITAL | Age: 42
Setting detail: THERAPIES SERIES
Discharge: HOME | End: 2017-10-26

## 2017-10-26 DIAGNOSIS — M25.521 RIGHT ELBOW PAIN: ICD-10-CM

## 2017-10-26 DIAGNOSIS — M54.2 NECK PAIN: Primary | ICD-10-CM

## 2017-10-26 PROCEDURE — 97110 THERAPEUTIC EXERCISES: CPT

## 2017-10-26 PROCEDURE — 97140 MANUAL THERAPY 1/> REGIONS: CPT

## 2017-10-26 PROCEDURE — G0283 ELEC STIM OTHER THAN WOUND: HCPCS

## 2017-10-26 PROCEDURE — 97010 HOT OR COLD PACKS THERAPY: CPT

## 2017-10-26 NOTE — THERAPY TREATMENT NOTE
Outpatient Physical Therapy Ortho Treatment Note   [place]     Patient Name: Rosie Cruz  : 1975  MRN: [id number]  Today's Date: 10/26/2017      Visit Date: 10/26/2017    Visit Dx:    ICD-10-CM ICD-9-CM   1. Neck pain M54.2 723.1   2. Right elbow pain M25.521 719.42       There is no problem list on file for this patient.       No past medical history on file.     Past Surgical History:   Procedure Laterality Date   • CHOLECYSTECTOMY     • HYSTERECTOMY               PT Ortho       10/26/17 1600    Subjective Comments    Subjective Comments Patient reports that therapy is helping her R) elbow. Patient reports that she is having more pain in the elbow compared to the neck.  -AC    Subjective Pain    Able to rate subjective pain? yes  -AC    Pre-Treatment Pain Level 2   R) elbow  -AC    Post-Treatment Pain Level 2   R) elbow  -AC      User Key  (r) = Recorded By, (t) = Taken By, (c) = Cosigned By    Initials Name Provider Type    DEE Vivar PTA Physical Therapy Assistant                            PT Assessment/Plan       10/26/17 2799       PT Assessment    Assessment Comments New ther ex added per the patient's tolerance, patient demonstrated and understood new ther ex with no increase in pain. Educated patient to perform ther ex p her tolerance, patient verbalized understanding. Kinesotaping performed to the R) wrist extensors/lateral elbow by Andrei Perry PT while performing ther ex during today's treatment session. STM performed to help decrease tightness in the R) wrist extensors. Patient tolerated treatment session well with rest breaks taken as needed by the patient. No adverse reactions with modalities or treatment.   -AC     PT Plan    PT Plan Comments Continue per PT's POC, progress per the patient's tolerance.  -AC       User Key  (r) = Recorded By, (t) = Taken By, (c) = Cosigned By    Initials Name Provider Type    DEE Vivar PTA Physical Therapy Assistant                 Modalities       10/26/17 1600          Moist Heat    MH Applied Yes   no redness noted following moist heat  -AC      Location R) elbow, cervical region  -AC      Rx Minutes 10 mins  -AC      MH Prior to Rx Yes  -AC      Ice    Ice Applied Yes  -AC      Location right elbow  -AC      Rx Minutes Other:   6 minutes  -AC      Ice S/P Rx Yes  -AC      Ultrasound 45554    Location Right lateral epicondyle  -AC      Rx Minutes 8 min  -AC      Duty Cycle 50  -AC      Frequency --   3.3MHz  -AC      Intensity - Wts/cm 1.2  -AC      Phonophoresis Yes  -AC      ELECTRICAL STIMULATION    Attended/Unattended Unattended   no irritation noted following estim  -AC      Stimulation Type Pre-Mod  -AC      Max mAmp --   per the patient's tolerance  -AC      Location/Electrode Placement/Other Cervical  -AC      Rx Minutes 10 mins  -AC        User Key  (r) = Recorded By, (t) = Taken By, (c) = Cosigned By    Initials Name Provider Type    AC Cynthia Vivar, PTA Physical Therapy Assistant                Exercises       10/26/17 1600          Subjective Comments    Subjective Comments Patient reports that therapy is helping her R) elbow. Patient reports that she is having more pain in the elbow compared to the neck.  -AC      Subjective Pain    Able to rate subjective pain? yes  -AC      Pre-Treatment Pain Level 2   R) elbow  -AC      Post-Treatment Pain Level 2   R) elbow  -AC      Exercise 1    Exercise Name 1 levator scap stretch 20 sec hold x3, UT stretch 20 sec hold x3, CROM (flex, ext, rot) x15 each, wrist flex 20 sec hold x3, wrist ext stretch 20 sec hold x3, radial deviation 10x2, wrist ext 10x2, pro/sup with hammer 1 min, wrist roller 2 min  -AC      Cueing 1 Verbal;Tactile  -AC      Time (Minutes) 1 25 minutes  -AC      Exercise 2    Exercise Name 2 kinesotaping applied over the wrist extensors during ther ex   by Andrei Perry, PT  -AC        User Key  (r) = Recorded By, (t) = Taken By, (c) = Cosigned By     Initials Name Provider Type    DEE Vivar PTA Physical Therapy Assistant                        Manual Rx (last 36 hours)      Manual Treatments       10/26/17 1500          Manual Rx 1    Manual Rx 1 Location Right elbow  -AC      Manual Rx 1 Type Soft tissue mobilization  -AC      Manual Rx 1 Grade as to pt's tolerance  -AC      Manual Rx 1 Duration 10 minutes  -AC        User Key  (r) = Recorded By, (t) = Taken By, (c) = Cosigned By    Initials Name Provider Type    DEE Vivar PTA Physical Therapy Assistant                    Therapy Education       10/26/17 1648          Therapy Education    Given HEP;Symptoms/condition management;Pain management  -AC      Program New  -AC      How Provided Verbal;Demonstration  -AC      Provided to Patient  -AC      Level of Understanding Verbalized;Demonstrated  -AC        User Key  (r) = Recorded By, (t) = Taken By, (c) = Cosigned By    Initials Name Provider Type    DEE Vivar PTA Physical Therapy Assistant                Time Calculation:   Start Time: 1505  Stop Time: 1605  Time Calculation (min): 60 min    Therapy Charges for Today     Code Description Service Date Service Provider Modifiers Qty    21641438771 HC PT THER PROC EA 15 MIN 10/26/2017 Cynthia Vivar PTA GP 2    51913649801 HC PT MANUAL THERAPY EA 15 MIN 10/26/2017 Cynthia Vivar PTA GP 1    28301986222 HC PT ELECTRICAL STIM UNATTENDED 10/26/2017 Cynthia Vivar PTA  1    48750098710 HC PT HOT/COLD PACK WC NONMCARE 10/26/2017 Cynthia Vivar PTA GP 1    56028050354 HC PT THER SUPP EA 15 MIN 10/26/2017 Cynthia Vivar PTA GP 1                    Cynthia Vivar PTA  10/26/2017

## 2017-10-30 ENCOUNTER — HOSPITAL ENCOUNTER (OUTPATIENT)
Dept: PHYSICAL THERAPY | Facility: HOSPITAL | Age: 42
Setting detail: THERAPIES SERIES
Discharge: HOME | End: 2017-10-30

## 2017-10-30 DIAGNOSIS — M54.2 NECK PAIN: Primary | ICD-10-CM

## 2017-10-30 DIAGNOSIS — M25.521 RIGHT ELBOW PAIN: ICD-10-CM

## 2017-10-30 PROCEDURE — 97110 THERAPEUTIC EXERCISES: CPT

## 2017-10-30 PROCEDURE — 97010 HOT OR COLD PACKS THERAPY: CPT

## 2017-10-30 PROCEDURE — 97140 MANUAL THERAPY 1/> REGIONS: CPT

## 2017-10-30 PROCEDURE — G0283 ELEC STIM OTHER THAN WOUND: HCPCS

## 2017-10-30 NOTE — THERAPY TREATMENT NOTE
Outpatient Physical Therapy Ortho Treatment Note  [facility]     Patient Name: Rosie Cruz  : 1975  MRN: [id number]  Today's Date: 10/30/2017      Visit Date: 10/30/2017    Visit Dx:    ICD-10-CM ICD-9-CM   1. Neck pain M54.2 723.1   2. Right elbow pain M25.521 719.42       There is no problem list on file for this patient.       No past medical history on file.     Past Surgical History:   Procedure Laterality Date   • CHOLECYSTECTOMY     • HYSTERECTOMY               PT Ortho       10/30/17 1700    Subjective Comments    Subjective Comments Patient states she was sore following last session, however she reports tolerable.  Pt states she has been utilzing tennis elbow brace.  Pt reports 1/10 neck, and 3/10 R) elbow pain prior to tx.   -SCAR    Subjective Pain    Able to rate subjective pain? yes  -SCAR    Pre-Treatment Pain Level 3   Neck:  1/10, Elbow:  310  -SCAR    Post-Treatment Pain Level 3   R) elbow  -SCAR      User Key  (r) = Recorded By, (t) = Taken By, (c) = Cosigned By    Initials Name Provider Type    SCAR Rossi PTA Physical Therapy Assistant                            PT Assessment/Plan       10/30/17 172       PT Assessment    Assessment Comments Patient completed today's session w/ no changes in pain reported pre and post tx.  Today's tx initiated w/ MH to cervical and R) elbow, Estim to cervical region for pain control f/b therex as per written flow sheet.  Treatment concluded with manual therapy, pulsed US, and cryotherapy.  Pt continues to progress w/ stretching, ROM, and strengthening activities to address goals and achieve maximum level of function.  No adverse reactions observed following modalities.   -SCAR     PT Plan    PT Plan Comments Continue with PT's POC and progress tx as tolerated by patient.   -SCAR       User Key  (r) = Recorded By, (t) = Taken By, (c) = Cosigned By    Initials Name Provider Type    SCAR Rossi PTA Physical Therapy Assistant            "     Modalities       10/30/17 1700          Moist Heat    MH Applied Yes  -SCAR      Location R) elbow, cervical region  -SCAR      Rx Minutes 10 mins  -SCAR      MH Prior to Rx Yes   w/ estim to cervical region in seated position  -SCAR      Ice    Ice Applied Yes  -SCAR      Location right elbow  -SCAR      Rx Minutes Other:   5 min  -SCAR      Ice S/P Rx Yes  -SCAR      Ultrasound 37104    Location Right lateral epicondyle  -SCAR      Rx Minutes 8 min  -SCAR      Duty Cycle 50  -SCAR      Frequency --   3.3MHz  -SCAR      Intensity - Wts/cm 1.2  -SCAR      ELECTRICAL STIMULATION    Attended/Unattended Unattended   no adverse reaction observed following  -SCAR      Stimulation Type Pre-Mod  -SCAR      Max mAmp --   as to pt's tolerance  -SCAR      Location/Electrode Placement/Other Cervical  -SCAR      Rx Minutes 10 mins  -SCAR        User Key  (r) = Recorded By, (t) = Taken By, (c) = Cosigned By    Initials Name Provider Type    SCAR Rossi PTA Physical Therapy Assistant                Exercises       10/30/17 1700          Subjective Comments    Subjective Comments Patient states she was sore following last session, however she states tolerable.  Pt states she has been utilzing tennis elbow brace.  Pt reports 1/10 neck, and 3/10 R) elbow pain prior to tx.   -SCAR      Subjective Pain    Able to rate subjective pain? yes  -SCAR      Pre-Treatment Pain Level 3   Neck:  1/10, Elbow:  3/10  -SCAR      Post-Treatment Pain Level 3   R) elbow  -SCAR      Exercise 1    Exercise Name 1 UT stretch 3x20\", lev scap stretch 3x20\", cervical AROM x15, wrist flex stretch 3x20\", wrist ext stretch 3x20\", gripper (red) x1 min, wrist flex/ext (1#) 10x2, wrist radial/ ulnar deviation (1#) 10x2  -SCAR      Cueing 1 Verbal;Tactile;Demo  -SCAR      Time (Minutes) 1 25 min  -SCAR        User Key  (r) = Recorded By, (t) = Taken By, (c) = Cosigned By    Initials Name Provider Type    SCAR Rossi PTA Physical Therapy Assistant                        Manual Rx " (last 36 hours)      Manual Treatments       10/30/17 1700          Manual Rx 1    Manual Rx 1 Location Right elbow  -SCAR      Manual Rx 1 Type Soft tissue mobilization  -SCAR      Manual Rx 1 Grade as to pt's tolerance  -SCAR      Manual Rx 1 Duration 10 minutes  -SCAR        User Key  (r) = Recorded By, (t) = Taken By, (c) = Cosigned By    Initials Name Provider Type    SCAR Rossi PTA Physical Therapy Assistant                    Therapy Education       10/30/17 1723          Therapy Education    Given HEP;Symptoms/condition management;Pain management  -SCAR      Program Reinforced  -SCAR      How Provided Verbal;Demonstration  -SCAR      Provided to Patient  -SCAR      Level of Understanding Verbalized;Demonstrated  -SCAR        User Key  (r) = Recorded By, (t) = Taken By, (c) = Cosigned By    Initials Name Provider Type    SCAR Rossi PTA Physical Therapy Assistant                Time Calculation:   Start Time: 1600  Stop Time: 1703  Time Calculation (min): 63 min    Therapy Charges for Today     Code Description Service Date Service Provider Modifiers Qty    52177800416 HC PT THER PROC EA 15 MIN 10/30/2017 Yojana Rossi PTA GP 2    19353516413 HC PT MANUAL THERAPY EA 15 MIN 10/30/2017 Yojana Rossi PTA GP 1    94574490550 HC PT ELECTRICAL STIM UNATTENDED 10/30/2017 Yojana Rossi PTA  1    63721710998 HC PT HOT/COLD PACK WC NONMCARE 10/30/2017 Yojana Rossi PTA GP 1                    Yojana Rossi PTA  10/30/2017

## 2017-11-02 ENCOUNTER — HOSPITAL ENCOUNTER (OUTPATIENT)
Dept: PHYSICAL THERAPY | Facility: HOSPITAL | Age: 42
Setting detail: THERAPIES SERIES
Discharge: HOME | End: 2017-11-02

## 2017-11-02 DIAGNOSIS — M25.521 RIGHT ELBOW PAIN: ICD-10-CM

## 2017-11-02 DIAGNOSIS — M54.2 NECK PAIN: Primary | ICD-10-CM

## 2017-11-02 PROCEDURE — 97010 HOT OR COLD PACKS THERAPY: CPT

## 2017-11-02 PROCEDURE — 97110 THERAPEUTIC EXERCISES: CPT

## 2017-11-02 PROCEDURE — 97035 APP MDLTY 1+ULTRASOUND EA 15: CPT

## 2017-11-02 PROCEDURE — G0283 ELEC STIM OTHER THAN WOUND: HCPCS

## 2017-11-02 NOTE — THERAPY TREATMENT NOTE
Outpatient Physical Therapy Ortho Treatment Note   [place]     Patient Name: Rosie Cruz  : 1975  MRN: [id number]  Today's Date: 2017      Visit Date: 2017    Visit Dx:    ICD-10-CM ICD-9-CM   1. Neck pain M54.2 723.1   2. Right elbow pain M25.521 719.42       There is no problem list on file for this patient.       No past medical history on file.     Past Surgical History:   Procedure Laterality Date   • CHOLECYSTECTOMY     • HYSTERECTOMY               PT Ortho       17 1700    Subjective Comments    Subjective Comments Patient arrives to therapy with increased pain in the R) elbow. Patient states that she continues to have pain and discomfort in the R) elbow. Patient reports that she isn't having any relief with therapy.   -AC    Subjective Pain    Able to rate subjective pain? yes  -AC    Pre-Treatment Pain Level 5   R) elbow  -AC    Post-Treatment Pain Level 4   R) elbow  -AC      User Key  (r) = Recorded By, (t) = Taken By, (c) = Cosigned By    Initials Name Provider Type    DEE Vivar, PTA Physical Therapy Assistant                            PT Assessment/Plan       17 0442       PT Assessment    Assessment Comments Discussed patient's treatment session with supervising PT, Andrei Perry, BLU due to patient's reports of increased pain and discomfort in the wrist extensor region with minimal activities. Patient has been advised by supervising PT at this time to f/u with family MD for diagnostic imaging to determine origin of pain. Conservative treatment session performed due to patient's reports of increased pain. No adverse reactions with modalities or treatment. Treatment sessions will be held at this time until f/u with MD.  -AC     PT Plan    PT Plan Comments PT will contact family MD to discuss patient's status. Patient reports she is going to contact family MD about reports.  -AC       User Key  (r) = Recorded By, (t) = Taken By, (c) = Cosigned By     Initials Name Provider Type    DEE Vivar PTA Physical Therapy Assistant                Modalities       11/02/17 1700          Moist Heat    MH Applied Yes  -AC      Location R) elbow, cervical region  -AC      Rx Minutes 15 mins  -AC      MH Prior to Rx Yes  -AC      Ice    Ice Applied Yes  -AC      Location right elbow  -AC      Rx Minutes Other:   5 minutes  -AC      Ice S/P Rx Yes  -AC      Ultrasound 57044    Location Right lateral epicondyle  -AC      Rx Minutes 8 min  -AC      Duty Cycle 50  -AC      Frequency --   3.3MHz  -AC      Intensity - Wts/cm 1.2  -AC      Phonophoresis Yes  -AC      ELECTRICAL STIMULATION    Attended/Unattended Unattended  -AC      Stimulation Type Pre-Mod  -AC      Max mAmp --   per the patient's tolerance  -AC      Location/Electrode Placement/Other Cervical  -AC      Rx Minutes 15 mins  -AC        User Key  (r) = Recorded By, (t) = Taken By, (c) = Cosigned By    Initials Name Provider Type    DEE Vivar PTA Physical Therapy Assistant                Exercises       11/02/17 1700          Subjective Comments    Subjective Comments Patient arrives to therapy with increased pain in the R) elbow. Patient states that she continues to have pain and discomfort in the R) elbow. Patient reports that she isn't having any relief with therapy.   -AC      Subjective Pain    Able to rate subjective pain? yes  -AC      Pre-Treatment Pain Level 5   R) elbow  -AC      Post-Treatment Pain Level 4   R) elbow  -AC      Exercise 1    Exercise Name 1 wrist ext stretch 20 sec hold x3, wrist flex stretch 20 sec hold x3, gripper (red) 2 min, wrist pronation/supination x15  -AC      Cueing 1 Verbal;Tactile;Demo  -AC      Time (Minutes) 1 15 minutes  -AC        User Key  (r) = Recorded By, (t) = Taken By, (c) = Cosigned By    Initials Name Provider Type    DEE Vivar PTA Physical Therapy Assistant                                   Therapy Education       11/02/17  1715          Therapy Education    Given HEP;Symptoms/condition management;Pain management;Edema management  -AC      Program Reinforced  -AC      How Provided Verbal;Demonstration  -AC      Provided to Patient  -AC      Level of Understanding Verbalized;Demonstrated  -AC        User Key  (r) = Recorded By, (t) = Taken By, (c) = Cosigned By    Initials Name Provider Type    AC Cynthia Vivar PTA Physical Therapy Assistant                Time Calculation:   Start Time: 0403  Stop Time: 0500  Time Calculation (min): 57 min    Therapy Charges for Today     Code Description Service Date Service Provider Modifiers Qty    82279320152 HC PT THER PROC EA 15 MIN 11/2/2017 Cynthia Vivar, ELLIE GP 1    94169885765 HC PT ELECTRICAL STIM UNATTENDED 11/2/2017 Cynthia Vivar PTA  1    80322659551 HC PT HOT/COLD PACK WC NONMCARE 11/2/2017 Cynthia Vivar PTA GP 1    32968841528 HC PT ULTRASOUND EA 15 MIN 11/2/2017 Cynthia Vivar PTA GP 1    81225728347 HC PT THER SUPP EA 15 MIN 11/2/2017 Cynthia Vivar PTA GP 1                    Cynthia Vivar PTA  11/2/2017

## 2017-12-05 ENCOUNTER — DOCUMENTATION (OUTPATIENT)
Dept: PHYSICAL THERAPY | Facility: HOSPITAL | Age: 42
End: 2017-12-05

## 2017-12-05 DIAGNOSIS — M54.2 NECK PAIN: Primary | ICD-10-CM

## 2017-12-05 DIAGNOSIS — M25.521 RIGHT ELBOW PAIN: ICD-10-CM

## 2017-12-05 NOTE — THERAPY DISCHARGE NOTE
Outpatient Physical Therapy Discharge Summary         Patient Name: Rosie Cruz  : 1975  MRN: [id number]    Today's Date: 2017    Visit Dx:    ICD-10-CM ICD-9-CM   1. Neck pain M54.2 723.1   2. Right elbow pain M25.521 719.42           OP PT Discharge Summary  Date of Discharge: 17  Reason for Discharge: other (comment) (Patient referred back to referring physician.)  Outcomes Achieved: Refer to plan of care for updates on goals achieved  Discharge Destination: Unknown  Discharge Instructions: Patient to be discharged at this time, due to referral back to referring physician.  Patient was encouraged to communicate with clinic after follow-up with MD; attempts were made to contact patient, but she could not be reached.  Patient attended therapy for a total of 12 sessions, dating from 2017 to 2017.  Current status is unknown since current outcome measures could not be obtained.      Time Calculation:                    Casi Ren, PT  2017

## 2018-02-12 ENCOUNTER — TRANSCRIBE ORDERS (OUTPATIENT)
Dept: ADMINISTRATIVE | Facility: HOSPITAL | Age: 43
End: 2018-02-12

## 2018-02-12 DIAGNOSIS — M25.521 RIGHT ELBOW PAIN: Primary | ICD-10-CM

## 2018-02-15 ENCOUNTER — APPOINTMENT (OUTPATIENT)
Dept: MRI IMAGING | Facility: HOSPITAL | Age: 43
End: 2018-02-15
Attending: ORTHOPAEDIC SURGERY

## 2018-02-16 ENCOUNTER — HOSPITAL ENCOUNTER (OUTPATIENT)
Dept: MRI IMAGING | Facility: HOSPITAL | Age: 43
Discharge: HOME | End: 2018-02-16
Attending: ORTHOPAEDIC SURGERY | Admitting: ORTHOPAEDIC SURGERY

## 2018-02-16 DIAGNOSIS — M25.521 RIGHT ELBOW PAIN: ICD-10-CM

## 2018-02-16 PROCEDURE — 73221 MRI JOINT UPR EXTREM W/O DYE: CPT

## 2018-04-28 ENCOUNTER — TRANSCRIBE ORDERS (OUTPATIENT)
Dept: ADMINISTRATIVE | Facility: HOSPITAL | Age: 43
End: 2018-04-28

## 2018-04-28 ENCOUNTER — LAB (OUTPATIENT)
Dept: LAB | Facility: HOSPITAL | Age: 43
End: 2018-04-28

## 2018-04-28 DIAGNOSIS — R53.81 DEBILITY: ICD-10-CM

## 2018-04-28 DIAGNOSIS — E78.2 MIXED HYPERLIPIDEMIA: ICD-10-CM

## 2018-04-28 DIAGNOSIS — E78.2 MIXED HYPERLIPIDEMIA: Primary | ICD-10-CM

## 2018-04-28 DIAGNOSIS — E55.9 AVITAMINOSIS D: ICD-10-CM

## 2018-04-28 LAB
ALBUMIN SERPL-MCNC: 4.5 G/DL (ref 3.5–5)
ALBUMIN/GLOB SERPL: 1.5 G/DL (ref 1.5–2.5)
ALP SERPL-CCNC: 63 U/L (ref 35–104)
ALT SERPL W P-5'-P-CCNC: 19 U/L (ref 10–36)
ANION GAP SERPL CALCULATED.3IONS-SCNC: 7.4 MMOL/L (ref 3.6–11.2)
AST SERPL-CCNC: 22 U/L (ref 10–30)
BILIRUB SERPL-MCNC: 0.7 MG/DL (ref 0.2–1.8)
BUN BLD-MCNC: 11 MG/DL (ref 7–21)
BUN/CREAT SERPL: 13.9 (ref 7–25)
CALCIUM SPEC-SCNC: 9.6 MG/DL (ref 7.7–10)
CHLORIDE SERPL-SCNC: 107 MMOL/L (ref 99–112)
CHOLEST SERPL-MCNC: 213 MG/DL (ref 0–200)
CO2 SERPL-SCNC: 25.6 MMOL/L (ref 24.3–31.9)
CREAT BLD-MCNC: 0.79 MG/DL (ref 0.43–1.29)
GFR SERPL CREATININE-BSD FRML MDRD: 79 ML/MIN/1.73
GLOBULIN UR ELPH-MCNC: 3.1 GM/DL
GLUCOSE BLD-MCNC: 92 MG/DL (ref 70–110)
HDLC SERPL-MCNC: 61 MG/DL (ref 60–100)
LDLC SERPL CALC-MCNC: 127 MG/DL (ref 0–100)
LDLC/HDLC SERPL: 2.08 {RATIO}
OSMOLALITY SERPL CALC.SUM OF ELEC: 278.4 MOSM/KG (ref 273–305)
POTASSIUM BLD-SCNC: 4.6 MMOL/L (ref 3.5–5.3)
PROT SERPL-MCNC: 7.6 G/DL (ref 6–8)
SODIUM BLD-SCNC: 140 MMOL/L (ref 135–153)
T4 SERPL-MCNC: 8.4 MCG/DL (ref 4.5–10.9)
TRIGL SERPL-MCNC: 125 MG/DL (ref 0–150)
TSH SERPL DL<=0.05 MIU/L-ACNC: 0.84 MIU/ML (ref 0.55–4.78)
VLDLC SERPL-MCNC: 25 MG/DL

## 2018-04-28 PROCEDURE — 80053 COMPREHEN METABOLIC PANEL: CPT

## 2018-04-28 PROCEDURE — 80061 LIPID PANEL: CPT

## 2018-04-28 PROCEDURE — 82652 VIT D 1 25-DIHYDROXY: CPT

## 2018-04-28 PROCEDURE — 84436 ASSAY OF TOTAL THYROXINE: CPT

## 2018-04-28 PROCEDURE — 84443 ASSAY THYROID STIM HORMONE: CPT

## 2018-04-28 PROCEDURE — 36415 COLL VENOUS BLD VENIPUNCTURE: CPT

## 2018-05-01 LAB — 1,25(OH)2D3 SERPL-MCNC: 60.5 PG/ML (ref 19.9–79.3)

## 2018-11-08 ENCOUNTER — LAB (OUTPATIENT)
Dept: LAB | Facility: HOSPITAL | Age: 43
End: 2018-11-08

## 2018-11-08 ENCOUNTER — TRANSCRIBE ORDERS (OUTPATIENT)
Dept: ADMINISTRATIVE | Facility: HOSPITAL | Age: 43
End: 2018-11-08

## 2018-11-08 DIAGNOSIS — R53.81 DEBILITY: ICD-10-CM

## 2018-11-08 DIAGNOSIS — R53.81 DEBILITY: Primary | ICD-10-CM

## 2018-11-08 DIAGNOSIS — E55.9 VITAMIN D DEFICIENCY: ICD-10-CM

## 2018-11-08 DIAGNOSIS — E78.2 MIXED HYPERLIPIDEMIA: ICD-10-CM

## 2018-11-08 LAB
25(OH)D3 SERPL-MCNC: 18 NG/ML
ALBUMIN SERPL-MCNC: 4.6 G/DL (ref 3.5–5)
ALBUMIN/GLOB SERPL: 1.7 G/DL (ref 1.5–2.5)
ALP SERPL-CCNC: 75 U/L (ref 35–104)
ALT SERPL W P-5'-P-CCNC: 13 U/L (ref 10–36)
ANION GAP SERPL CALCULATED.3IONS-SCNC: 7.4 MMOL/L (ref 3.6–11.2)
AST SERPL-CCNC: 16 U/L (ref 10–30)
BILIRUB SERPL-MCNC: 0.4 MG/DL (ref 0.2–1.8)
BUN BLD-MCNC: 10 MG/DL (ref 7–21)
BUN/CREAT SERPL: 12.8 (ref 7–25)
CALCIUM SPEC-SCNC: 9.6 MG/DL (ref 7.7–10)
CHLORIDE SERPL-SCNC: 110 MMOL/L (ref 99–112)
CHOLEST SERPL-MCNC: 213 MG/DL (ref 0–200)
CO2 SERPL-SCNC: 22.6 MMOL/L (ref 24.3–31.9)
CREAT BLD-MCNC: 0.78 MG/DL (ref 0.43–1.29)
GFR SERPL CREATININE-BSD FRML MDRD: 81 ML/MIN/1.73
GLOBULIN UR ELPH-MCNC: 2.7 GM/DL
GLUCOSE BLD-MCNC: 94 MG/DL (ref 70–110)
HDLC SERPL-MCNC: 60 MG/DL (ref 60–100)
LDLC SERPL CALC-MCNC: 131 MG/DL (ref 0–100)
LDLC/HDLC SERPL: 2.19 {RATIO}
OSMOLALITY SERPL CALC.SUM OF ELEC: 278.2 MOSM/KG (ref 273–305)
POTASSIUM BLD-SCNC: 4 MMOL/L (ref 3.5–5.3)
PROT SERPL-MCNC: 7.3 G/DL (ref 6–8)
SODIUM BLD-SCNC: 140 MMOL/L (ref 135–153)
T4 SERPL-MCNC: 7.8 MCG/DL (ref 4.5–10.9)
TRIGL SERPL-MCNC: 109 MG/DL (ref 0–150)
TSH SERPL DL<=0.05 MIU/L-ACNC: 1.29 MIU/ML (ref 0.55–4.78)
VLDLC SERPL-MCNC: 21.8 MG/DL

## 2018-11-08 PROCEDURE — 82306 VITAMIN D 25 HYDROXY: CPT

## 2018-11-08 PROCEDURE — 84436 ASSAY OF TOTAL THYROXINE: CPT

## 2018-11-08 PROCEDURE — 84443 ASSAY THYROID STIM HORMONE: CPT

## 2018-11-08 PROCEDURE — 80053 COMPREHEN METABOLIC PANEL: CPT

## 2018-11-08 PROCEDURE — 36415 COLL VENOUS BLD VENIPUNCTURE: CPT

## 2018-11-08 PROCEDURE — 80061 LIPID PANEL: CPT

## 2019-05-08 ENCOUNTER — TRANSCRIBE ORDERS (OUTPATIENT)
Dept: ADMINISTRATIVE | Facility: HOSPITAL | Age: 44
End: 2019-05-08

## 2019-05-08 ENCOUNTER — LAB (OUTPATIENT)
Dept: LAB | Facility: HOSPITAL | Age: 44
End: 2019-05-08

## 2019-05-08 DIAGNOSIS — E78.2 MIXED HYPERLIPIDEMIA: ICD-10-CM

## 2019-05-08 DIAGNOSIS — E55.9 VITAMIN D DEFICIENCY: ICD-10-CM

## 2019-05-08 DIAGNOSIS — E55.9 VITAMIN D DEFICIENCY: Primary | ICD-10-CM

## 2019-05-08 LAB
25(OH)D3 SERPL-MCNC: 29.1 NG/ML (ref 30–100)
ALBUMIN SERPL-MCNC: 4.7 G/DL (ref 3.5–5.2)
ALBUMIN/GLOB SERPL: 1.7 G/DL
ALP SERPL-CCNC: 65 U/L (ref 39–117)
ALT SERPL W P-5'-P-CCNC: 7 U/L (ref 1–33)
ANION GAP SERPL CALCULATED.3IONS-SCNC: 13.2 MMOL/L
AST SERPL-CCNC: 11 U/L (ref 1–32)
BILIRUB SERPL-MCNC: 0.5 MG/DL (ref 0.2–1.2)
BUN BLD-MCNC: 12 MG/DL (ref 6–20)
BUN/CREAT SERPL: 15.8 (ref 7–25)
CALCIUM SPEC-SCNC: 9.3 MG/DL (ref 8.6–10.5)
CHLORIDE SERPL-SCNC: 105 MMOL/L (ref 98–107)
CHOLEST SERPL-MCNC: 218 MG/DL (ref 0–200)
CO2 SERPL-SCNC: 22.8 MMOL/L (ref 22–29)
CREAT BLD-MCNC: 0.76 MG/DL (ref 0.57–1)
GFR SERPL CREATININE-BSD FRML MDRD: 83 ML/MIN/1.73
GLOBULIN UR ELPH-MCNC: 2.8 GM/DL
GLUCOSE BLD-MCNC: 94 MG/DL (ref 65–99)
HDLC SERPL-MCNC: 52 MG/DL (ref 40–60)
LDLC SERPL CALC-MCNC: 141 MG/DL (ref 0–100)
LDLC/HDLC SERPL: 2.72 {RATIO}
POTASSIUM BLD-SCNC: 4.3 MMOL/L (ref 3.5–5.2)
PROT SERPL-MCNC: 7.5 G/DL (ref 6–8.5)
SODIUM BLD-SCNC: 141 MMOL/L (ref 136–145)
T4 FREE SERPL-MCNC: 1.11 NG/DL (ref 0.93–1.7)
TRIGL SERPL-MCNC: 123 MG/DL (ref 0–150)
TSH SERPL DL<=0.05 MIU/L-ACNC: 1.89 MIU/ML (ref 0.27–4.2)
VLDLC SERPL-MCNC: 24.6 MG/DL (ref 5–40)

## 2019-05-08 PROCEDURE — 82306 VITAMIN D 25 HYDROXY: CPT

## 2019-05-08 PROCEDURE — 36415 COLL VENOUS BLD VENIPUNCTURE: CPT

## 2019-05-08 PROCEDURE — 84439 ASSAY OF FREE THYROXINE: CPT

## 2019-05-08 PROCEDURE — 80053 COMPREHEN METABOLIC PANEL: CPT

## 2019-05-08 PROCEDURE — 80061 LIPID PANEL: CPT

## 2019-05-08 PROCEDURE — 84443 ASSAY THYROID STIM HORMONE: CPT

## 2019-06-21 ENCOUNTER — HOSPITAL ENCOUNTER (OUTPATIENT)
Dept: GENERAL RADIOLOGY | Facility: HOSPITAL | Age: 44
Discharge: HOME | End: 2019-06-21
Admitting: PHYSICIAN ASSISTANT

## 2019-06-21 ENCOUNTER — TRANSCRIBE ORDERS (OUTPATIENT)
Dept: GENERAL RADIOLOGY | Facility: HOSPITAL | Age: 44
End: 2019-06-21

## 2019-06-21 DIAGNOSIS — M79.671 RIGHT FOOT PAIN: ICD-10-CM

## 2019-06-21 DIAGNOSIS — M79.671 RIGHT FOOT PAIN: Primary | ICD-10-CM

## 2019-06-21 PROCEDURE — 73630 X-RAY EXAM OF FOOT: CPT

## 2019-06-21 PROCEDURE — 73630 X-RAY EXAM OF FOOT: CPT | Performed by: RADIOLOGY

## 2019-07-29 ENCOUNTER — OFFICE VISIT (OUTPATIENT)
Dept: ORTHOPEDIC SURGERY | Facility: CLINIC | Age: 44
End: 2019-07-29

## 2019-07-29 VITALS
DIASTOLIC BLOOD PRESSURE: 86 MMHG | HEART RATE: 73 BPM | BODY MASS INDEX: 23.84 KG/M2 | SYSTOLIC BLOOD PRESSURE: 137 MMHG | WEIGHT: 151.9 LBS | HEIGHT: 67 IN

## 2019-07-29 DIAGNOSIS — M79.671 RIGHT FOOT PAIN: Primary | ICD-10-CM

## 2019-07-29 PROCEDURE — 99203 OFFICE O/P NEW LOW 30 MIN: CPT | Performed by: ORTHOPAEDIC SURGERY

## 2019-07-29 NOTE — PROGRESS NOTES
New Patient Visit      Patient: Rosie Cruz  YOB: 1975  Date of Encounter: 07/29/2019        Chief Complaint:   Chief Complaint   Patient presents with   • Right Foot - Pain, Edema         HPI:   Rosie Cruz, 44 y.o. female, referred by Thom Rea MD presents today with pain along the lateral aspect of her right foot.  She gives no history of trauma.  She did do has some increased walking while on vacation and noticed some swelling lateral aspect of her foot.  Since then her swelling has resolved but she continues to struggle with pain.  She is also training for a marathon.  She runs several days per week and is trying to improve her lamina.  She reports that in the past she has been told that she had plantar fasciitis but she currently denies heel pain.  Symptoms been present over the past 2 months and she describes them as nearly constant.  She does not experience weakness or numbness in his right foot or leg.      Active Problem List:  Patient Active Problem List   Diagnosis   • Right foot pain         Past Medical History:  Past Medical History:   Diagnosis Date   • GERD (gastroesophageal reflux disease)    • High cholesterol          Past Surgical History:  Past Surgical History:   Procedure Laterality Date   • CHOLECYSTECTOMY     • HYSTERECTOMY           Family History:  Family History   Problem Relation Age of Onset   • Osteoarthritis Mother    • Hypertension Mother    • Cancer Father    • Hypertension Father    • Gout Maternal Grandfather    • Cancer Maternal Grandfather    • Diabetes Maternal Grandfather    • Hypertension Maternal Grandfather          Social History:  Social History     Socioeconomic History   • Marital status:      Spouse name: Not on file   • Number of children: Not on file   • Years of education: Not on file   • Highest education level: Not on file   Tobacco Use   • Smoking status: Never Smoker   • Smokeless tobacco: Never Used   Substance and Sexual  Activity   • Alcohol use: Yes     Comment: 1-2/week   • Drug use: No   • Sexual activity: Defer     Body mass index is 23.7 kg/m².      Medications:  Current Outpatient Medications   Medication Sig Dispense Refill   • carvedilol CR (COREG CR) 10 MG 24 hr capsule Take 1 capsule by mouth Daily. 90 capsule 3   • MethylPREDNISolone (MEDROL, HUGH,) 4 MG tablet TAKE BY MOUTH AS DIRECTED ON PACKAGE. 21 tablet 6   • raNITIdine (ZANTAC) 300 MG tablet Take 1 tablet by mouth 2 (Two) Times a Day. 60 tablet 5   • rosuvastatin (CRESTOR) 5 MG tablet Take 1 tablet by mouth Daily. 90 tablet 3   • vitamin D (ERGOCALCIFEROL) 18678 units capsule capsule Take 1 capsule by mouth 1 (One) Time Per Week. 12 capsule 3   • amitriptyline (ELAVIL) 10 MG tablet Take 1 tablet by mouth Every Night at bedtime. 30 tablet 5   • atenolol (TENORMIN) 25 MG tablet Take 1 tablet by mouth Daily. 30 tablet 5   • atenolol (TENORMIN) 25 MG tablet Take 1 tablet by mouth daily 30 tablet 5   • atenolol (TENORMIN) 25 MG tablet Take 1 tablet by mouth Daily. 90 tablet 3   • vitamin D (ERGOCALCIFEROL) 21543 UNITS capsule capsule Take 1 capsule by mouth 1 (one) time per week. 12 capsule 5   • vitamin D (ERGOCALCIFEROL) 98056 units capsule capsule Take 1 capsule by mouth 1 (One) Time Per Week. 12 capsule 5     No current facility-administered medications for this visit.          Allergies:  Allergies   Allergen Reactions   • Penicillins          Review of Systems:   Review of Systems   Constitutional: Positive for activity change.   HENT: Negative.    Eyes: Negative.    Respiratory: Negative.    Cardiovascular: Negative.    Gastrointestinal: Negative.    Endocrine: Negative.    Genitourinary: Negative.    Musculoskeletal: Positive for arthralgias.   Skin: Negative.    Allergic/Immunologic: Negative.    Neurological: Negative.    Hematological: Negative.    Psychiatric/Behavioral: Negative.          Physical Exam:   Physical Exam  GENERAL: 44 y.o. female, alert and  "oriented X 3 in no acute distress.   Visit Vitals  /86   Pulse 73   Ht 170.5 cm (67.13\")   Wt 68.9 kg (151 lb 14.4 oz)   BMI 23.70 kg/m²     Musculoskeletal: Foot evaluation reveals no localized swelling she has normal contour and normal longitudinal arch.  She has no tenderness at the insertion of the plantar fascia.  Has normal subtalar motion normal ankle mobility neurovascular is grossly intact.  She has no localized tenderness along the fifth metatarsal region.    Radiology/Labs: Right foot radiographs by report and by review are negative.      Assessment & Plan:   44 y.o. female lateral right foot pain of 2 months duration.  I think it is an inopportune time for her to begin in for a marathon.  I think. reducing her activity may reduce her foot pain.  I also recommended shoe inserts which I think may help her foot pain help her running and reduce her risk for developing plantar fasciitis in the future.  We will follow-up if her symptoms progress.      ICD-10-CM ICD-9-CM   1. Right foot pain M79.671 729.5           Cc:   Thom Rea MD                This document has been electronically signed by Luis Perez MD   July 29, 2019 5:48 PM      "

## 2019-08-15 ENCOUNTER — TRANSCRIBE ORDERS (OUTPATIENT)
Dept: OTHER | Facility: OTHER | Age: 44
End: 2019-08-15

## 2019-08-15 ENCOUNTER — LAB (OUTPATIENT)
Dept: LAB | Facility: HOSPITAL | Age: 44
End: 2019-08-15

## 2019-08-15 DIAGNOSIS — E55.9 VITAMIN D DEFICIENCY: ICD-10-CM

## 2019-08-15 DIAGNOSIS — E78.2 MIXED HYPERLIPIDEMIA: ICD-10-CM

## 2019-08-15 DIAGNOSIS — R53.81 OTHER MALAISE: ICD-10-CM

## 2019-08-15 DIAGNOSIS — E55.9 VITAMIN D DEFICIENCY: Primary | ICD-10-CM

## 2019-08-15 LAB
25(OH)D3 SERPL-MCNC: 32.6 NG/ML (ref 30–100)
ALBUMIN SERPL-MCNC: 4.9 G/DL (ref 3.5–5.2)
ALBUMIN/GLOB SERPL: 2 G/DL
ALP SERPL-CCNC: 76 U/L (ref 39–117)
ALT SERPL W P-5'-P-CCNC: 9 U/L (ref 1–33)
ANION GAP SERPL CALCULATED.3IONS-SCNC: 14.1 MMOL/L (ref 5–15)
AST SERPL-CCNC: 11 U/L (ref 1–32)
BILIRUB SERPL-MCNC: 0.4 MG/DL (ref 0.2–1.2)
BUN BLD-MCNC: 9 MG/DL (ref 6–20)
BUN/CREAT SERPL: 11 (ref 7–25)
CALCIUM SPEC-SCNC: 10 MG/DL (ref 8.6–10.5)
CHLORIDE SERPL-SCNC: 102 MMOL/L (ref 98–107)
CHOLEST SERPL-MCNC: 156 MG/DL (ref 0–200)
CO2 SERPL-SCNC: 23.9 MMOL/L (ref 22–29)
CREAT BLD-MCNC: 0.82 MG/DL (ref 0.57–1)
GFR SERPL CREATININE-BSD FRML MDRD: 76 ML/MIN/1.73
GLOBULIN UR ELPH-MCNC: 2.4 GM/DL
GLUCOSE BLD-MCNC: 92 MG/DL (ref 65–99)
HDLC SERPL-MCNC: 59 MG/DL (ref 40–60)
LDLC SERPL CALC-MCNC: 73 MG/DL (ref 0–100)
LDLC/HDLC SERPL: 1.23 {RATIO}
POTASSIUM BLD-SCNC: 4.3 MMOL/L (ref 3.5–5.2)
PROT SERPL-MCNC: 7.3 G/DL (ref 6–8.5)
SODIUM BLD-SCNC: 140 MMOL/L (ref 136–145)
T4 SERPL-MCNC: 7.24 MCG/DL (ref 4.5–11.7)
TRIGL SERPL-MCNC: 121 MG/DL (ref 0–150)
TSH SERPL DL<=0.05 MIU/L-ACNC: 1.12 MIU/ML (ref 0.27–4.2)
VLDLC SERPL-MCNC: 24.2 MG/DL (ref 5–40)

## 2019-08-15 PROCEDURE — 80061 LIPID PANEL: CPT

## 2019-08-15 PROCEDURE — 36415 COLL VENOUS BLD VENIPUNCTURE: CPT

## 2019-08-15 PROCEDURE — 84443 ASSAY THYROID STIM HORMONE: CPT

## 2019-08-15 PROCEDURE — 80053 COMPREHEN METABOLIC PANEL: CPT

## 2019-08-15 PROCEDURE — 84436 ASSAY OF TOTAL THYROXINE: CPT

## 2019-08-15 PROCEDURE — 82306 VITAMIN D 25 HYDROXY: CPT

## 2019-12-15 ENCOUNTER — TRANSCRIBE ORDERS (OUTPATIENT)
Dept: ADMINISTRATIVE | Facility: HOSPITAL | Age: 44
End: 2019-12-15

## 2019-12-15 ENCOUNTER — APPOINTMENT (OUTPATIENT)
Dept: LAB | Facility: HOSPITAL | Age: 44
End: 2019-12-15

## 2019-12-15 DIAGNOSIS — E78.2 MIXED HYPERLIPIDEMIA: ICD-10-CM

## 2019-12-15 DIAGNOSIS — R53.81 DEBILITY: ICD-10-CM

## 2019-12-15 DIAGNOSIS — E55.9 VITAMIN D DEFICIENCY: Primary | ICD-10-CM

## 2019-12-15 LAB
25(OH)D3 SERPL-MCNC: 43.9 NG/ML (ref 30–100)
ALBUMIN SERPL-MCNC: 4.7 G/DL (ref 3.5–5.2)
ALBUMIN/GLOB SERPL: 1.6 G/DL
ALP SERPL-CCNC: 79 U/L (ref 39–117)
ALT SERPL W P-5'-P-CCNC: 7 U/L (ref 1–33)
ANION GAP SERPL CALCULATED.3IONS-SCNC: 11.8 MMOL/L (ref 5–15)
AST SERPL-CCNC: 12 U/L (ref 1–32)
BILIRUB SERPL-MCNC: 0.4 MG/DL (ref 0.2–1.2)
BUN BLD-MCNC: 14 MG/DL (ref 6–20)
BUN/CREAT SERPL: 17.5 (ref 7–25)
CALCIUM SPEC-SCNC: 9.6 MG/DL (ref 8.6–10.5)
CHLORIDE SERPL-SCNC: 105 MMOL/L (ref 98–107)
CHOLEST SERPL-MCNC: 150 MG/DL (ref 0–200)
CO2 SERPL-SCNC: 26.2 MMOL/L (ref 22–29)
CREAT BLD-MCNC: 0.8 MG/DL (ref 0.57–1)
GFR SERPL CREATININE-BSD FRML MDRD: 78 ML/MIN/1.73
GLOBULIN UR ELPH-MCNC: 2.9 GM/DL
GLUCOSE BLD-MCNC: 94 MG/DL (ref 65–99)
HDLC SERPL-MCNC: 54 MG/DL (ref 40–60)
LDLC SERPL CALC-MCNC: 77 MG/DL (ref 0–100)
LDLC/HDLC SERPL: 1.43 {RATIO}
POTASSIUM BLD-SCNC: 4.4 MMOL/L (ref 3.5–5.2)
PROT SERPL-MCNC: 7.6 G/DL (ref 6–8.5)
SODIUM BLD-SCNC: 143 MMOL/L (ref 136–145)
T4 FREE SERPL-MCNC: 1.26 NG/DL (ref 0.93–1.7)
TRIGL SERPL-MCNC: 94 MG/DL (ref 0–150)
TSH SERPL DL<=0.05 MIU/L-ACNC: 1.03 UIU/ML (ref 0.27–4.2)
VLDLC SERPL-MCNC: 18.8 MG/DL (ref 5–40)

## 2019-12-15 PROCEDURE — 82306 VITAMIN D 25 HYDROXY: CPT | Performed by: NURSE PRACTITIONER

## 2019-12-15 PROCEDURE — 36415 COLL VENOUS BLD VENIPUNCTURE: CPT | Performed by: NURSE PRACTITIONER

## 2019-12-15 PROCEDURE — 80061 LIPID PANEL: CPT | Performed by: NURSE PRACTITIONER

## 2019-12-15 PROCEDURE — 84439 ASSAY OF FREE THYROXINE: CPT | Performed by: NURSE PRACTITIONER

## 2019-12-15 PROCEDURE — 80053 COMPREHEN METABOLIC PANEL: CPT | Performed by: NURSE PRACTITIONER

## 2019-12-15 PROCEDURE — 84443 ASSAY THYROID STIM HORMONE: CPT | Performed by: NURSE PRACTITIONER

## 2020-05-23 ENCOUNTER — TRANSCRIBE ORDERS (OUTPATIENT)
Dept: ADMINISTRATIVE | Facility: HOSPITAL | Age: 45
End: 2020-05-23

## 2020-05-23 ENCOUNTER — APPOINTMENT (OUTPATIENT)
Dept: LAB | Facility: HOSPITAL | Age: 45
End: 2020-05-23

## 2020-05-23 DIAGNOSIS — R53.81 DEBILITY: ICD-10-CM

## 2020-05-23 DIAGNOSIS — E78.2 MIXED HYPERLIPIDEMIA: ICD-10-CM

## 2020-05-23 DIAGNOSIS — E55.9 VITAMIN D DEFICIENCY DISEASE: Primary | ICD-10-CM

## 2020-05-23 LAB
ALBUMIN SERPL-MCNC: 4.8 G/DL (ref 3.5–5.2)
ALBUMIN/GLOB SERPL: 2 G/DL
ALP SERPL-CCNC: 69 U/L (ref 39–117)
ALT SERPL W P-5'-P-CCNC: 12 U/L (ref 1–33)
ANION GAP SERPL CALCULATED.3IONS-SCNC: 8.7 MMOL/L (ref 5–15)
AST SERPL-CCNC: 15 U/L (ref 1–32)
BASOPHILS # BLD AUTO: 0.05 10*3/MM3 (ref 0–0.2)
BASOPHILS NFR BLD AUTO: 0.8 % (ref 0–1.5)
BILIRUB SERPL-MCNC: 0.4 MG/DL (ref 0.2–1.2)
BUN BLD-MCNC: 10 MG/DL (ref 6–20)
BUN/CREAT SERPL: 11.6 (ref 7–25)
CALCIUM SPEC-SCNC: 9.8 MG/DL (ref 8.6–10.5)
CHLORIDE SERPL-SCNC: 105 MMOL/L (ref 98–107)
CHOLEST SERPL-MCNC: 153 MG/DL (ref 0–200)
CO2 SERPL-SCNC: 27.3 MMOL/L (ref 22–29)
CREAT BLD-MCNC: 0.86 MG/DL (ref 0.57–1)
DEPRECATED RDW RBC AUTO: 45.4 FL (ref 37–54)
EOSINOPHIL # BLD AUTO: 0.15 10*3/MM3 (ref 0–0.4)
EOSINOPHIL NFR BLD AUTO: 2.5 % (ref 0.3–6.2)
ERYTHROCYTE [DISTWIDTH] IN BLOOD BY AUTOMATED COUNT: 12.6 % (ref 12.3–15.4)
GFR SERPL CREATININE-BSD FRML MDRD: 71 ML/MIN/1.73
GLOBULIN UR ELPH-MCNC: 2.4 GM/DL
GLUCOSE BLD-MCNC: 102 MG/DL (ref 65–99)
HCT VFR BLD AUTO: 41 % (ref 34–46.6)
HDLC SERPL-MCNC: 47 MG/DL (ref 40–60)
HGB BLD-MCNC: 13.8 G/DL (ref 12–15.9)
IMM GRANULOCYTES # BLD AUTO: 0.01 10*3/MM3 (ref 0–0.05)
IMM GRANULOCYTES NFR BLD AUTO: 0.2 % (ref 0–0.5)
LDLC SERPL CALC-MCNC: 81 MG/DL (ref 0–100)
LDLC/HDLC SERPL: 1.73 {RATIO}
LYMPHOCYTES # BLD AUTO: 2.1 10*3/MM3 (ref 0.7–3.1)
LYMPHOCYTES NFR BLD AUTO: 35.2 % (ref 19.6–45.3)
MCH RBC QN AUTO: 32.4 PG (ref 26.6–33)
MCHC RBC AUTO-ENTMCNC: 33.7 G/DL (ref 31.5–35.7)
MCV RBC AUTO: 96.2 FL (ref 79–97)
MONOCYTES # BLD AUTO: 0.43 10*3/MM3 (ref 0.1–0.9)
MONOCYTES NFR BLD AUTO: 7.2 % (ref 5–12)
NEUTROPHILS # BLD AUTO: 3.23 10*3/MM3 (ref 1.7–7)
NEUTROPHILS NFR BLD AUTO: 54.1 % (ref 42.7–76)
NRBC BLD AUTO-RTO: 0 /100 WBC (ref 0–0.2)
PLATELET # BLD AUTO: 211 10*3/MM3 (ref 140–450)
PMV BLD AUTO: 11.3 FL (ref 6–12)
POTASSIUM BLD-SCNC: 4.5 MMOL/L (ref 3.5–5.2)
PROT SERPL-MCNC: 7.2 G/DL (ref 6–8.5)
RBC # BLD AUTO: 4.26 10*6/MM3 (ref 3.77–5.28)
SODIUM BLD-SCNC: 141 MMOL/L (ref 136–145)
T4 FREE SERPL-MCNC: 1.2 NG/DL (ref 0.93–1.7)
TRIGL SERPL-MCNC: 124 MG/DL (ref 0–150)
TSH SERPL DL<=0.05 MIU/L-ACNC: 1.35 UIU/ML (ref 0.27–4.2)
VLDLC SERPL-MCNC: 24.8 MG/DL (ref 5–40)
WBC NRBC COR # BLD: 5.97 10*3/MM3 (ref 3.4–10.8)

## 2020-05-23 PROCEDURE — 36415 COLL VENOUS BLD VENIPUNCTURE: CPT | Performed by: NURSE PRACTITIONER

## 2020-05-23 PROCEDURE — 85025 COMPLETE CBC W/AUTO DIFF WBC: CPT | Performed by: NURSE PRACTITIONER

## 2020-05-23 PROCEDURE — 84443 ASSAY THYROID STIM HORMONE: CPT | Performed by: NURSE PRACTITIONER

## 2020-05-23 PROCEDURE — 80061 LIPID PANEL: CPT | Performed by: NURSE PRACTITIONER

## 2020-05-23 PROCEDURE — 80053 COMPREHEN METABOLIC PANEL: CPT | Performed by: NURSE PRACTITIONER

## 2020-05-23 PROCEDURE — 84439 ASSAY OF FREE THYROXINE: CPT | Performed by: NURSE PRACTITIONER

## 2020-10-04 ENCOUNTER — TRANSCRIBE ORDERS (OUTPATIENT)
Dept: ADMINISTRATIVE | Facility: HOSPITAL | Age: 45
End: 2020-10-04

## 2020-10-04 ENCOUNTER — LAB (OUTPATIENT)
Dept: LAB | Facility: HOSPITAL | Age: 45
End: 2020-10-04

## 2020-10-04 DIAGNOSIS — E55.9 VITAMIN D DEFICIENCY DISEASE: ICD-10-CM

## 2020-10-04 DIAGNOSIS — E55.9 VITAMIN D DEFICIENCY DISEASE: Primary | ICD-10-CM

## 2020-10-04 DIAGNOSIS — E78.2 MIXED HYPERLIPIDEMIA: ICD-10-CM

## 2020-10-04 LAB
25(OH)D3 SERPL-MCNC: 37.6 NG/ML (ref 30–100)
ALBUMIN SERPL-MCNC: 4.6 G/DL (ref 3.5–5.2)
ALBUMIN/GLOB SERPL: 1.7 G/DL
ALP SERPL-CCNC: 86 U/L (ref 39–117)
ALT SERPL W P-5'-P-CCNC: 15 U/L (ref 1–33)
ANION GAP SERPL CALCULATED.3IONS-SCNC: 8.7 MMOL/L (ref 5–15)
AST SERPL-CCNC: 15 U/L (ref 1–32)
BILIRUB SERPL-MCNC: 0.4 MG/DL (ref 0–1.2)
BUN SERPL-MCNC: 15 MG/DL (ref 6–20)
BUN/CREAT SERPL: 19.5 (ref 7–25)
CALCIUM SPEC-SCNC: 9.5 MG/DL (ref 8.6–10.5)
CHLORIDE SERPL-SCNC: 103 MMOL/L (ref 98–107)
CHOLEST SERPL-MCNC: 177 MG/DL (ref 0–200)
CO2 SERPL-SCNC: 27.3 MMOL/L (ref 22–29)
CREAT SERPL-MCNC: 0.77 MG/DL (ref 0.57–1)
DEPRECATED RDW RBC AUTO: 43.2 FL (ref 37–54)
EOSINOPHIL # BLD MANUAL: 0.17 10*3/MM3 (ref 0–0.4)
EOSINOPHIL NFR BLD MANUAL: 3 % (ref 0.3–6.2)
ERYTHROCYTE [DISTWIDTH] IN BLOOD BY AUTOMATED COUNT: 12.7 % (ref 12.3–15.4)
GFR SERPL CREATININE-BSD FRML MDRD: 81 ML/MIN/1.73
GLOBULIN UR ELPH-MCNC: 2.7 GM/DL
GLUCOSE SERPL-MCNC: 102 MG/DL (ref 65–99)
HCT VFR BLD AUTO: 39.4 % (ref 34–46.6)
HDLC SERPL-MCNC: 55 MG/DL (ref 40–60)
HGB BLD-MCNC: 13.6 G/DL (ref 12–15.9)
LDLC SERPL CALC-MCNC: 105 MG/DL (ref 0–100)
LDLC/HDLC SERPL: 1.91 {RATIO}
LYMPHOCYTES # BLD MANUAL: 1.6 10*3/MM3 (ref 0.7–3.1)
LYMPHOCYTES NFR BLD MANUAL: 29 % (ref 19.6–45.3)
LYMPHOCYTES NFR BLD MANUAL: 5 % (ref 5–12)
MCH RBC QN AUTO: 32.2 PG (ref 26.6–33)
MCHC RBC AUTO-ENTMCNC: 34.5 G/DL (ref 31.5–35.7)
MCV RBC AUTO: 93.4 FL (ref 79–97)
MONOCYTES # BLD AUTO: 0.28 10*3/MM3 (ref 0.1–0.9)
NEUTROPHILS # BLD AUTO: 3.47 10*3/MM3 (ref 1.7–7)
NEUTROPHILS NFR BLD MANUAL: 63 % (ref 42.7–76)
PLAT MORPH BLD: NORMAL
PLATELET # BLD AUTO: 196 10*3/MM3 (ref 140–450)
PMV BLD AUTO: 10.7 FL (ref 6–12)
POTASSIUM SERPL-SCNC: 4.8 MMOL/L (ref 3.5–5.2)
PROT SERPL-MCNC: 7.3 G/DL (ref 6–8.5)
RBC # BLD AUTO: 4.22 10*6/MM3 (ref 3.77–5.28)
RBC MORPH BLD: NORMAL
SODIUM SERPL-SCNC: 139 MMOL/L (ref 136–145)
T4 FREE SERPL-MCNC: 1.19 NG/DL (ref 0.93–1.7)
TRIGL SERPL-MCNC: 85 MG/DL (ref 0–150)
TSH SERPL DL<=0.05 MIU/L-ACNC: 1.17 UIU/ML (ref 0.27–4.2)
VLDLC SERPL-MCNC: 17 MG/DL (ref 5–40)
WBC # BLD AUTO: 5.51 10*3/MM3 (ref 3.4–10.8)
WBC MORPH BLD: NORMAL

## 2020-10-04 PROCEDURE — 80053 COMPREHEN METABOLIC PANEL: CPT

## 2020-10-04 PROCEDURE — 84443 ASSAY THYROID STIM HORMONE: CPT

## 2020-10-04 PROCEDURE — 85007 BL SMEAR W/DIFF WBC COUNT: CPT

## 2020-10-04 PROCEDURE — 84439 ASSAY OF FREE THYROXINE: CPT

## 2020-10-04 PROCEDURE — 82306 VITAMIN D 25 HYDROXY: CPT

## 2020-10-04 PROCEDURE — 80061 LIPID PANEL: CPT

## 2020-10-04 PROCEDURE — 36415 COLL VENOUS BLD VENIPUNCTURE: CPT

## 2020-10-04 PROCEDURE — 85027 COMPLETE CBC AUTOMATED: CPT

## 2021-01-10 ENCOUNTER — LAB (OUTPATIENT)
Dept: LAB | Facility: HOSPITAL | Age: 46
End: 2021-01-10

## 2021-01-10 ENCOUNTER — TRANSCRIBE ORDERS (OUTPATIENT)
Dept: ADMINISTRATIVE | Facility: HOSPITAL | Age: 46
End: 2021-01-10

## 2021-01-10 DIAGNOSIS — E55.9 VITAMIN D DEFICIENCY DISEASE: Primary | ICD-10-CM

## 2021-01-10 DIAGNOSIS — E78.2 MIXED HYPERLIPIDEMIA: ICD-10-CM

## 2021-01-10 DIAGNOSIS — R53.81 MALAISE: ICD-10-CM

## 2021-01-10 LAB
ALBUMIN SERPL-MCNC: 4.6 G/DL (ref 3.5–5.2)
ALBUMIN/GLOB SERPL: 1.9 G/DL
ALP SERPL-CCNC: 70 U/L (ref 39–117)
ALT SERPL W P-5'-P-CCNC: 19 U/L (ref 1–33)
ANION GAP SERPL CALCULATED.3IONS-SCNC: 8.9 MMOL/L (ref 5–15)
AST SERPL-CCNC: 16 U/L (ref 1–32)
BASOPHILS # BLD AUTO: 0.06 10*3/MM3 (ref 0–0.2)
BASOPHILS NFR BLD AUTO: 0.7 % (ref 0–1.5)
BILIRUB SERPL-MCNC: 0.4 MG/DL (ref 0–1.2)
BUN SERPL-MCNC: 16 MG/DL (ref 6–20)
BUN/CREAT SERPL: 19.5 (ref 7–25)
CALCIUM SPEC-SCNC: 9.1 MG/DL (ref 8.6–10.5)
CHLORIDE SERPL-SCNC: 105 MMOL/L (ref 98–107)
CHOLEST SERPL-MCNC: 155 MG/DL (ref 0–200)
CO2 SERPL-SCNC: 25.1 MMOL/L (ref 22–29)
CREAT SERPL-MCNC: 0.82 MG/DL (ref 0.57–1)
DEPRECATED RDW RBC AUTO: 43.3 FL (ref 37–54)
EOSINOPHIL # BLD AUTO: 0.13 10*3/MM3 (ref 0–0.4)
EOSINOPHIL NFR BLD AUTO: 1.5 % (ref 0.3–6.2)
ERYTHROCYTE [DISTWIDTH] IN BLOOD BY AUTOMATED COUNT: 12.5 % (ref 12.3–15.4)
GFR SERPL CREATININE-BSD FRML MDRD: 75 ML/MIN/1.73
GLOBULIN UR ELPH-MCNC: 2.4 GM/DL
GLUCOSE SERPL-MCNC: 90 MG/DL (ref 65–99)
HCT VFR BLD AUTO: 41.1 % (ref 34–46.6)
HDLC SERPL-MCNC: 53 MG/DL (ref 40–60)
HGB BLD-MCNC: 14 G/DL (ref 12–15.9)
IMM GRANULOCYTES # BLD AUTO: 0.02 10*3/MM3 (ref 0–0.05)
IMM GRANULOCYTES NFR BLD AUTO: 0.2 % (ref 0–0.5)
LDLC SERPL CALC-MCNC: 88 MG/DL (ref 0–100)
LDLC/HDLC SERPL: 1.65 {RATIO}
LYMPHOCYTES # BLD AUTO: 2.87 10*3/MM3 (ref 0.7–3.1)
LYMPHOCYTES NFR BLD AUTO: 33.7 % (ref 19.6–45.3)
MCH RBC QN AUTO: 32 PG (ref 26.6–33)
MCHC RBC AUTO-ENTMCNC: 34.1 G/DL (ref 31.5–35.7)
MCV RBC AUTO: 93.8 FL (ref 79–97)
MONOCYTES # BLD AUTO: 0.54 10*3/MM3 (ref 0.1–0.9)
MONOCYTES NFR BLD AUTO: 6.3 % (ref 5–12)
NEUTROPHILS NFR BLD AUTO: 4.89 10*3/MM3 (ref 1.7–7)
NEUTROPHILS NFR BLD AUTO: 57.6 % (ref 42.7–76)
NRBC BLD AUTO-RTO: 0 /100 WBC (ref 0–0.2)
PLATELET # BLD AUTO: 205 10*3/MM3 (ref 140–450)
PMV BLD AUTO: 11.3 FL (ref 6–12)
POTASSIUM SERPL-SCNC: 4.3 MMOL/L (ref 3.5–5.2)
PROT SERPL-MCNC: 7 G/DL (ref 6–8.5)
RBC # BLD AUTO: 4.38 10*6/MM3 (ref 3.77–5.28)
SODIUM SERPL-SCNC: 139 MMOL/L (ref 136–145)
T4 FREE SERPL-MCNC: 1.18 NG/DL (ref 0.93–1.7)
TRIGL SERPL-MCNC: 73 MG/DL (ref 0–150)
TSH SERPL DL<=0.05 MIU/L-ACNC: 1.35 UIU/ML (ref 0.27–4.2)
VLDLC SERPL-MCNC: 14 MG/DL (ref 5–40)
WBC # BLD AUTO: 8.51 10*3/MM3 (ref 3.4–10.8)

## 2021-01-10 PROCEDURE — 80061 LIPID PANEL: CPT | Performed by: NURSE PRACTITIONER

## 2021-01-10 PROCEDURE — 84443 ASSAY THYROID STIM HORMONE: CPT | Performed by: NURSE PRACTITIONER

## 2021-01-10 PROCEDURE — 80053 COMPREHEN METABOLIC PANEL: CPT | Performed by: NURSE PRACTITIONER

## 2021-01-10 PROCEDURE — 36415 COLL VENOUS BLD VENIPUNCTURE: CPT | Performed by: NURSE PRACTITIONER

## 2021-01-10 PROCEDURE — 85025 COMPLETE CBC W/AUTO DIFF WBC: CPT | Performed by: NURSE PRACTITIONER

## 2021-01-10 PROCEDURE — 84439 ASSAY OF FREE THYROXINE: CPT | Performed by: NURSE PRACTITIONER

## 2021-01-12 ENCOUNTER — IMMUNIZATION (OUTPATIENT)
Dept: VACCINE CLINIC | Facility: HOSPITAL | Age: 46
End: 2021-01-12

## 2021-01-12 PROCEDURE — 91300 HC SARSCOV02 VAC 30MCG/0.3ML IM: CPT | Performed by: FAMILY MEDICINE

## 2021-01-12 PROCEDURE — 0001A: CPT | Performed by: FAMILY MEDICINE

## 2021-02-02 ENCOUNTER — IMMUNIZATION (OUTPATIENT)
Dept: VACCINE CLINIC | Facility: HOSPITAL | Age: 46
End: 2021-02-02

## 2021-02-02 PROCEDURE — 91300 HC SARSCOV02 VAC 30MCG/0.3ML IM: CPT | Performed by: INTERNAL MEDICINE

## 2021-02-02 PROCEDURE — 0002A: CPT | Performed by: INTERNAL MEDICINE

## 2021-08-31 ENCOUNTER — HOSPITAL ENCOUNTER (OUTPATIENT)
Dept: BONE DENSITY | Facility: HOSPITAL | Age: 46
Discharge: HOME | End: 2021-08-31

## 2021-08-31 ENCOUNTER — HOSPITAL ENCOUNTER (OUTPATIENT)
Dept: MAMMOGRAPHY | Facility: HOSPITAL | Age: 46
Discharge: HOME | End: 2021-08-31

## 2021-08-31 DIAGNOSIS — Z12.31 VISIT FOR SCREENING MAMMOGRAM: ICD-10-CM

## 2021-08-31 DIAGNOSIS — M81.0 POST-MENOPAUSAL OSTEOPOROSIS: ICD-10-CM

## 2021-08-31 PROCEDURE — 77067 SCR MAMMO BI INCL CAD: CPT

## 2021-08-31 PROCEDURE — 77063 BREAST TOMOSYNTHESIS BI: CPT | Performed by: RADIOLOGY

## 2021-08-31 PROCEDURE — 77067 SCR MAMMO BI INCL CAD: CPT | Performed by: RADIOLOGY

## 2021-08-31 PROCEDURE — 77063 BREAST TOMOSYNTHESIS BI: CPT

## 2021-08-31 PROCEDURE — 77080 DXA BONE DENSITY AXIAL: CPT | Performed by: RADIOLOGY

## 2021-08-31 PROCEDURE — 77080 DXA BONE DENSITY AXIAL: CPT

## 2021-10-03 ENCOUNTER — LAB (OUTPATIENT)
Dept: LAB | Facility: HOSPITAL | Age: 46
End: 2021-10-03

## 2021-10-03 ENCOUNTER — TRANSCRIBE ORDERS (OUTPATIENT)
Dept: ADMINISTRATIVE | Facility: HOSPITAL | Age: 46
End: 2021-10-03

## 2021-10-03 DIAGNOSIS — N95.1 FEMALE CLIMACTERIC STATE: ICD-10-CM

## 2021-10-03 DIAGNOSIS — N95.1 FEMALE CLIMACTERIC STATE: Primary | ICD-10-CM

## 2021-10-03 LAB
25(OH)D3 SERPL-MCNC: 33.6 NG/ML (ref 30–100)
ALBUMIN SERPL-MCNC: 4.9 G/DL (ref 3.5–5.2)
ALBUMIN/GLOB SERPL: 2 G/DL
ALP SERPL-CCNC: 73 U/L (ref 39–117)
ALT SERPL W P-5'-P-CCNC: 24 U/L (ref 1–33)
ANION GAP SERPL CALCULATED.3IONS-SCNC: 13.5 MMOL/L (ref 5–15)
AST SERPL-CCNC: 16 U/L (ref 1–32)
BILIRUB SERPL-MCNC: 0.4 MG/DL (ref 0–1.2)
BUN SERPL-MCNC: 12 MG/DL (ref 6–20)
BUN/CREAT SERPL: 15 (ref 7–25)
CALCIUM SPEC-SCNC: 9.5 MG/DL (ref 8.6–10.5)
CHLORIDE SERPL-SCNC: 106 MMOL/L (ref 98–107)
CHOLEST SERPL-MCNC: 199 MG/DL (ref 0–200)
CO2 SERPL-SCNC: 21.5 MMOL/L (ref 22–29)
CREAT SERPL-MCNC: 0.8 MG/DL (ref 0.57–1)
DEPRECATED RDW RBC AUTO: 48.1 FL (ref 37–54)
ERYTHROCYTE [DISTWIDTH] IN BLOOD BY AUTOMATED COUNT: 13.1 % (ref 12.3–15.4)
ESTRADIOL SERPL HS-MCNC: 94.9 PG/ML
FSH SERPL-ACNC: 2.66 MIU/ML
GFR SERPL CREATININE-BSD FRML MDRD: 77 ML/MIN/1.73
GLOBULIN UR ELPH-MCNC: 2.4 GM/DL
GLUCOSE SERPL-MCNC: 82 MG/DL (ref 65–99)
HCT VFR BLD AUTO: 43.7 % (ref 34–46.6)
HDLC SERPL-MCNC: 58 MG/DL (ref 40–60)
HGB BLD-MCNC: 14.4 G/DL (ref 12–15.9)
LDLC SERPL CALC-MCNC: 117 MG/DL (ref 0–100)
LDLC/HDLC SERPL: 1.97 {RATIO}
LH SERPL-ACNC: 4.29 MIU/ML
MCH RBC QN AUTO: 32.6 PG (ref 26.6–33)
MCHC RBC AUTO-ENTMCNC: 33 G/DL (ref 31.5–35.7)
MCV RBC AUTO: 98.9 FL (ref 79–97)
PLATELET # BLD AUTO: 183 10*3/MM3 (ref 140–450)
PMV BLD AUTO: 11.6 FL (ref 6–12)
POTASSIUM SERPL-SCNC: 4.4 MMOL/L (ref 3.5–5.2)
PROGEST SERPL-MCNC: 5.24 NG/ML
PROLACTIN SERPL-MCNC: 8.16 NG/ML (ref 4.79–23.3)
PROT SERPL-MCNC: 7.3 G/DL (ref 6–8.5)
PTH-INTACT SERPL-MCNC: 37.6 PG/ML (ref 15–65)
RBC # BLD AUTO: 4.42 10*6/MM3 (ref 3.77–5.28)
SODIUM SERPL-SCNC: 141 MMOL/L (ref 136–145)
T4 FREE SERPL-MCNC: 1.11 NG/DL (ref 0.93–1.7)
TRIGL SERPL-MCNC: 135 MG/DL (ref 0–150)
TSH SERPL DL<=0.05 MIU/L-ACNC: 1.06 UIU/ML (ref 0.27–4.2)
VLDLC SERPL-MCNC: 24 MG/DL (ref 5–40)
WBC # BLD AUTO: 6.95 10*3/MM3 (ref 3.4–10.8)

## 2021-10-03 PROCEDURE — 84443 ASSAY THYROID STIM HORMONE: CPT

## 2021-10-03 PROCEDURE — 84403 ASSAY OF TOTAL TESTOSTERONE: CPT

## 2021-10-03 PROCEDURE — 80061 LIPID PANEL: CPT

## 2021-10-03 PROCEDURE — 84402 ASSAY OF FREE TESTOSTERONE: CPT

## 2021-10-03 PROCEDURE — 84144 ASSAY OF PROGESTERONE: CPT

## 2021-10-03 PROCEDURE — 80053 COMPREHEN METABOLIC PANEL: CPT

## 2021-10-03 PROCEDURE — 82626 DEHYDROEPIANDROSTERONE: CPT

## 2021-10-03 PROCEDURE — 84146 ASSAY OF PROLACTIN: CPT

## 2021-10-03 PROCEDURE — 83001 ASSAY OF GONADOTROPIN (FSH): CPT

## 2021-10-03 PROCEDURE — 85027 COMPLETE CBC AUTOMATED: CPT

## 2021-10-03 PROCEDURE — 83970 ASSAY OF PARATHORMONE: CPT

## 2021-10-03 PROCEDURE — 82306 VITAMIN D 25 HYDROXY: CPT

## 2021-10-03 PROCEDURE — 83002 ASSAY OF GONADOTROPIN (LH): CPT

## 2021-10-03 PROCEDURE — 84439 ASSAY OF FREE THYROXINE: CPT

## 2021-10-03 PROCEDURE — 82670 ASSAY OF TOTAL ESTRADIOL: CPT

## 2021-10-03 PROCEDURE — 36415 COLL VENOUS BLD VENIPUNCTURE: CPT

## 2021-10-09 LAB
DHEA SERPL-MCNC: 179 NG/DL (ref 31–701)
TESTOST FREE SERPL-MCNC: 1.9 PG/ML (ref 0–4.2)
TESTOST FREE SERPL-MCNC: 1.9 PG/ML (ref 0–4.2)
TESTOST SERPL-MCNC: 18 NG/DL (ref 4–50)
TESTOST SERPL-MCNC: 21.6 NG/DL

## 2021-10-14 ENCOUNTER — OFFICE VISIT (OUTPATIENT)
Dept: OBSTETRICS AND GYNECOLOGY | Facility: CLINIC | Age: 46
End: 2021-10-14

## 2021-10-14 VITALS
SYSTOLIC BLOOD PRESSURE: 132 MMHG | HEIGHT: 65 IN | WEIGHT: 210 LBS | DIASTOLIC BLOOD PRESSURE: 74 MMHG | BODY MASS INDEX: 34.99 KG/M2

## 2021-10-14 DIAGNOSIS — Z12.39 ENCOUNTER FOR BREAST CANCER SCREENING OTHER THAN MAMMOGRAM: ICD-10-CM

## 2021-10-14 DIAGNOSIS — N95.1 MENOPAUSAL SYMPTOMS: ICD-10-CM

## 2021-10-14 DIAGNOSIS — Z01.419 ENCOUNTER FOR GYNECOLOGICAL EXAMINATION (GENERAL) (ROUTINE) WITHOUT ABNORMAL FINDINGS: Primary | ICD-10-CM

## 2021-10-14 PROCEDURE — 99386 PREV VISIT NEW AGE 40-64: CPT | Performed by: OBSTETRICS & GYNECOLOGY

## 2021-10-14 RX ORDER — NEBIVOLOL 5 MG/1
5 TABLET ORAL DAILY
COMMUNITY

## 2021-10-16 NOTE — PROGRESS NOTES
Chief Complaint  Gynecologic Exam     History of Present Illness:  Patient is 46 y.o.  who presents to [facility] is a new patient for her annual examination.  Patient gives a history of having a hysterectomy in .  Patient reports benign pathology.  Patient had fibroids.  Patient still has her ovaries.  Patient has been having hot flashes and night sweats.  Patient did have recent hormone levels obtained.  Patient did have colonoscopy earlier this year.  Patient also have bone density scan as well as mammogram.  Patient sees Dr. Rea for her primary care.    History  Past Medical History:   Diagnosis Date   • Abnormal ECG     Bystolic   • Fibroid 2011    removed via hysterectomy   • GERD (gastroesophageal reflux disease)    • High cholesterol    • Migraine 2021    tried medication   • PMS (premenstrual syndrome) 1986    [facility] - Hysterectomy cured   • PONV (postoperative nausea and vomiting)    • Varicella 1982     Current Outpatient Medications on File Prior to Visit   Medication Sig Dispense Refill   • nebivolol (BYSTOLIC) 5 MG tablet Take 5 mg by mouth Daily.     • Rimegepant Sulfate (Nurtec) 75 MG tablet dispersible tablet Place 1 tablet (75 mg) on top of tongue, allow to dissolve then swallow once as needed for migraine; max 1 dose/24 hours 8 tablet 11   • rosuvastatin (CRESTOR) 5 MG tablet Take 1 tablet by mouth Daily. 90 tablet 3   • ubrogepant (ubrogepant) 100 MG tablet Take 1 tablet (100 mg) by mouth once. 10 tablet 11   • vitamin D (ERGOCALCIFEROL) 1.25 MG (73556 UT) capsule capsule Take 1 capsule by mouth every week 12 capsule 3   • atenolol (TENORMIN) 25 MG tablet Take 1 tablet by mouth daily 30 tablet 5     No current facility-administered medications on file prior to visit.     Allergies   Allergen Reactions   • Penicillins      Past Surgical History:   Procedure Laterality Date   • CHOLECYSTECTOMY     • COLONOSCOPY  2021      "Rosy   • HYSTERECTOMY     • LAPAROSCOPIC CHOLECYSTECTOMY  2003   • VAGINAL HYSTERECTOMY  August 2011   • WISDOM TOOTH EXTRACTION  1993     Family History   Problem Relation Age of Onset   • Osteoarthritis Mother    • Hypertension Mother    • Deep vein thrombosis Mother    • Pulmonary embolism Mother    • Stroke Mother    • Osteoporosis Mother    • Cancer Father    • Hypertension Father    • Colon cancer Father    • Gout Maternal Grandfather    • Cancer Maternal Grandfather    • Diabetes Maternal Grandfather    • Hypertension Maternal Grandfather    • Diabetes Paternal Grandfather    • Breast cancer Neg Hx      Social History     Socioeconomic History   • Marital status:    Tobacco Use   • Smoking status: Never Smoker   • Smokeless tobacco: Never Used   Vaping Use   • Vaping Use: Never used   Substance and Sexual Activity   • Alcohol use: Yes     Alcohol/week: 2.0 standard drinks     Types: 1 Glasses of wine, 1 Standard drinks or equivalent per week     Comment: 1-2/week   • Drug use: No   • Sexual activity: Yes     Partners: Male     Birth control/protection: Surgical     Comment: Hysterectomy       Physical Examination:  Vital Signs: /74   Ht 165.1 cm (65\")   Wt 95.3 kg (210 lb)   BMI 34.95 kg/m²     General Appearance: alert, appears stated age, and cooperative  Breasts: Examined in supine position  Symmetric without masses or skin dimpling  Nipples normal without inversion, lesions or discharge  There are no palpable axillary nodes  Abdomen: no masses, no hepatomegaly, no splenomegaly, soft non-tender, no guarding and no rebound tenderness  Pelvic: Clinical staff was present for exam  External genitalia:  normal appearance of the external genitalia including Bartholin's and Valley Hi's glands.  :  urethral meatus normal;  Vaginal:  atrophic mucosal changes are present;  Cervix:  absent.  Uterus:  absent.  Adnexa:  non palpable bilaterally.  Pap smear done and specimen sent using Thin-Prep " technique    Data Review:  The following data was reviewed by: Cherelle Carrera MD on 10/14/2021:     Labs:  Testosterone (Free & Total), LC / MS (10/03/2021 10:59)  Testosterone, Free, Total (10/03/2021 10:59)  Estradiol (10/03/2021 10:59)  Progesterone (10/03/2021 10:59)  FSH & LH (10/03/2021 10:59)    Imaging:  DEXA Bone Density Axial (08/31/2021 15:14)  Mammo Screening Digital Tomosynthesis Bilateral With CAD (08/31/2021 15:14)    Medical Records:  None    Assessment and Plan   Problem List Items Addressed This Visit     None      Visit Diagnoses     Encounter for gynecological examination (general) (routine) without abnormal findings    -  Primary  Pap was done today.  If she does not receive the results of the Pap within 2 weeks  time, she was instructed to call to find out the results.  I explained to Rosie that the recommendations for Pap smear interval in a low risk patient has lengthened to 3 years time if cytology alone normal or  5 years time if both cytology and HPV testing were normal.  I encouraged her to be seen yearly for a full physical exam including breast and pelvic exam even during the off years when PAP's will not be performed.    Relevant Orders    Pap IG, Rfx HPV ASCU    Encounter for breast cancer screening other than mammogram      It is recommended per ACOG, for women at average risk to start annual mammogram screening at the age of 40 until the age of 75 and an individualized decision be made for women after age 75.  She was encouraged to continue getting yearly mammograms.  She should report any palpable breast lump(s) or skin changes regardless of mammographic findings.  I explained to Rosie that notification regarding her mammogram results will come from the center performing the study.  Our office will not be routinely calling with mammogram results.  It is her responsibility to make sure that the results from the mammogram are communicated to her by the breast center.  If she has  any questions about the results, she is welcome to call our office anytime.  The patient reports she has done her mammogram and results are noted.    Rosie was counseled regarding having clinical breast exams and breast self-awareness.  Women aged 29-39 years of age should have clinical breast exams every 1-3 years and yearly aged 40 and older.  The patient was counseled regarding breast self-awareness focusing on having a sense of what is normal for her breasts so that she can tell if there are changes.  Even small changes should be reported to provider.    Menopausal symptoms      The various options for the management of menopausal symptoms was discussed.  The medical treatment options discussed include HRT, SSRIs, SSNRIs, clonidine, and gabapentin.  The risks and benefits were discussed including the findings from the WHI study.  The increased risk of breast cancer, CAD, stroke, and VTE events were discussed for combination therapy vs the increased risk of CV events and breast cancer not being seen in the estrogen only group.  The lowest effective dose for the shortest duration of treatment was discussed in regards to HRT.  Other alternatives including otc supplements and lifestyle changes were also discussed.  Local estrogen therapy to relieve atrophic vaginal symptoms was discussed was well as other alternatives.  Patient has had hormone levels obtained which are reviewed today.  Patient is to call if worsening of her symptoms and or patient desires treatment as discussed.          Follow Up/Instructions:  Follow up as noted.  Patient was given instructions and counseling regarding her condition or for health maintenance advice. Please see specific information pulled into the AVS if appropriate.     Note: Speech recognition transcription software may have been used to dictate portions of this document.  An attempt at proofreading has been made though minor errors in transcription may still be  present.    This note was electronically signed.  Cherelle Carrera M.D.

## 2021-10-29 DIAGNOSIS — Z01.419 ENCOUNTER FOR GYNECOLOGICAL EXAMINATION (GENERAL) (ROUTINE) WITHOUT ABNORMAL FINDINGS: ICD-10-CM

## 2022-01-05 ENCOUNTER — IMMUNIZATION (OUTPATIENT)
Dept: VACCINE CLINIC | Facility: HOSPITAL | Age: 47
End: 2022-01-05

## 2022-01-05 PROCEDURE — 0004A HC ADM SARSCOV2 30MCG/0.3ML BOOSTER: CPT | Performed by: INTERNAL MEDICINE

## 2022-01-05 PROCEDURE — 91300 HC SARSCOV02 VAC 30MCG/0.3ML IM: CPT | Performed by: INTERNAL MEDICINE

## 2022-02-19 ENCOUNTER — TRANSCRIBE ORDERS (OUTPATIENT)
Dept: ADMINISTRATIVE | Facility: HOSPITAL | Age: 47
End: 2022-02-19

## 2022-02-19 ENCOUNTER — LAB (OUTPATIENT)
Dept: LAB | Facility: HOSPITAL | Age: 47
End: 2022-02-19

## 2022-02-19 DIAGNOSIS — E78.2 MIXED HYPERLIPIDEMIA: Primary | ICD-10-CM

## 2022-02-19 DIAGNOSIS — E78.2 MIXED HYPERLIPIDEMIA: ICD-10-CM

## 2022-02-19 LAB
25(OH)D3 SERPL-MCNC: 36.5 NG/ML (ref 30–100)
ALBUMIN SERPL-MCNC: 4.6 G/DL (ref 3.5–5.2)
ALBUMIN/GLOB SERPL: 1.6 G/DL
ALP SERPL-CCNC: 80 U/L (ref 39–117)
ALT SERPL W P-5'-P-CCNC: 21 U/L (ref 1–33)
ANION GAP SERPL CALCULATED.3IONS-SCNC: 13.8 MMOL/L (ref 5–15)
AST SERPL-CCNC: 17 U/L (ref 1–32)
BILIRUB SERPL-MCNC: 0.4 MG/DL (ref 0–1.2)
BUN SERPL-MCNC: 12 MG/DL (ref 6–20)
BUN/CREAT SERPL: 12.9 (ref 7–25)
CALCIUM SPEC-SCNC: 9.7 MG/DL (ref 8.6–10.5)
CHLORIDE SERPL-SCNC: 105 MMOL/L (ref 98–107)
CHOLEST SERPL-MCNC: 214 MG/DL (ref 0–200)
CO2 SERPL-SCNC: 22.2 MMOL/L (ref 22–29)
CREAT SERPL-MCNC: 0.93 MG/DL (ref 0.57–1)
DEPRECATED RDW RBC AUTO: 44.1 FL (ref 37–54)
EOSINOPHIL # BLD MANUAL: 0.08 10*3/MM3 (ref 0–0.4)
EOSINOPHIL NFR BLD MANUAL: 1.1 % (ref 0.3–6.2)
ERYTHROCYTE [DISTWIDTH] IN BLOOD BY AUTOMATED COUNT: 12.6 % (ref 12.3–15.4)
GFR SERPL CREATININE-BSD FRML MDRD: 65 ML/MIN/1.73
GLOBULIN UR ELPH-MCNC: 2.8 GM/DL
GLUCOSE SERPL-MCNC: 98 MG/DL (ref 65–99)
HCT VFR BLD AUTO: 41.4 % (ref 34–46.6)
HDLC SERPL-MCNC: 64 MG/DL (ref 40–60)
HGB BLD-MCNC: 14.1 G/DL (ref 12–15.9)
LDLC SERPL CALC-MCNC: 135 MG/DL (ref 0–100)
LDLC/HDLC SERPL: 2.08 {RATIO}
LYMPHOCYTES # BLD MANUAL: 2.37 10*3/MM3 (ref 0.7–3.1)
LYMPHOCYTES NFR BLD MANUAL: 4.4 % (ref 5–12)
MCH RBC QN AUTO: 32.5 PG (ref 26.6–33)
MCHC RBC AUTO-ENTMCNC: 34.1 G/DL (ref 31.5–35.7)
MCV RBC AUTO: 95.4 FL (ref 79–97)
MONOCYTES # BLD: 0.32 10*3/MM3 (ref 0.1–0.9)
NEUTROPHILS # BLD AUTO: 4.42 10*3/MM3 (ref 1.7–7)
NEUTROPHILS NFR BLD MANUAL: 61.5 % (ref 42.7–76)
PLAT MORPH BLD: NORMAL
PLATELET # BLD AUTO: 223 10*3/MM3 (ref 140–450)
PMV BLD AUTO: 10.5 FL (ref 6–12)
POTASSIUM SERPL-SCNC: 3.9 MMOL/L (ref 3.5–5.2)
PROT SERPL-MCNC: 7.4 G/DL (ref 6–8.5)
RBC # BLD AUTO: 4.34 10*6/MM3 (ref 3.77–5.28)
RBC MORPH BLD: NORMAL
SODIUM SERPL-SCNC: 141 MMOL/L (ref 136–145)
T4 FREE SERPL-MCNC: 1.22 NG/DL (ref 0.93–1.7)
TRIGL SERPL-MCNC: 84 MG/DL (ref 0–150)
TSH SERPL DL<=0.05 MIU/L-ACNC: 0.96 UIU/ML (ref 0.27–4.2)
VARIANT LYMPHS NFR BLD MANUAL: 33 % (ref 19.6–45.3)
VLDLC SERPL-MCNC: 15 MG/DL (ref 5–40)
WBC MORPH BLD: NORMAL
WBC NRBC COR # BLD: 7.18 10*3/MM3 (ref 3.4–10.8)

## 2022-02-19 PROCEDURE — 36415 COLL VENOUS BLD VENIPUNCTURE: CPT

## 2022-02-19 PROCEDURE — 84439 ASSAY OF FREE THYROXINE: CPT

## 2022-02-19 PROCEDURE — 84443 ASSAY THYROID STIM HORMONE: CPT

## 2022-02-19 PROCEDURE — 80061 LIPID PANEL: CPT

## 2022-02-19 PROCEDURE — 85027 COMPLETE CBC AUTOMATED: CPT

## 2022-02-19 PROCEDURE — 85007 BL SMEAR W/DIFF WBC COUNT: CPT

## 2022-02-19 PROCEDURE — 82306 VITAMIN D 25 HYDROXY: CPT

## 2022-02-19 PROCEDURE — 80053 COMPREHEN METABOLIC PANEL: CPT

## 2022-07-24 ENCOUNTER — TRANSCRIBE ORDERS (OUTPATIENT)
Dept: OTHER | Facility: OTHER | Age: 47
End: 2022-07-24

## 2022-07-24 ENCOUNTER — LAB (OUTPATIENT)
Dept: LAB | Facility: HOSPITAL | Age: 47
End: 2022-07-24

## 2022-07-24 DIAGNOSIS — E78.2 MIXED HYPERLIPIDEMIA: ICD-10-CM

## 2022-07-24 DIAGNOSIS — E55.9 AVITAMINOSIS D: Primary | ICD-10-CM

## 2022-07-24 DIAGNOSIS — E55.9 AVITAMINOSIS D: ICD-10-CM

## 2022-07-24 DIAGNOSIS — R53.81 DEBILITY: ICD-10-CM

## 2022-07-24 LAB
25(OH)D3 SERPL-MCNC: 41.1 NG/ML (ref 30–100)
ALBUMIN SERPL-MCNC: 4.3 G/DL (ref 3.5–5.2)
ALBUMIN/GLOB SERPL: 1.6 G/DL
ALP SERPL-CCNC: 71 U/L (ref 39–117)
ALT SERPL W P-5'-P-CCNC: 19 U/L (ref 1–33)
ANION GAP SERPL CALCULATED.3IONS-SCNC: 11.2 MMOL/L (ref 5–15)
AST SERPL-CCNC: 14 U/L (ref 1–32)
BILIRUB SERPL-MCNC: 0.4 MG/DL (ref 0–1.2)
BUN SERPL-MCNC: 13 MG/DL (ref 6–20)
BUN/CREAT SERPL: 17.6 (ref 7–25)
CALCIUM SPEC-SCNC: 9.2 MG/DL (ref 8.6–10.5)
CHLORIDE SERPL-SCNC: 106 MMOL/L (ref 98–107)
CHOLEST SERPL-MCNC: 164 MG/DL (ref 0–200)
CO2 SERPL-SCNC: 22.8 MMOL/L (ref 22–29)
CREAT SERPL-MCNC: 0.74 MG/DL (ref 0.57–1)
DEPRECATED RDW RBC AUTO: 45.7 FL (ref 37–54)
EGFRCR SERPLBLD CKD-EPI 2021: 100.6 ML/MIN/1.73
ERYTHROCYTE [DISTWIDTH] IN BLOOD BY AUTOMATED COUNT: 12.9 % (ref 12.3–15.4)
GLOBULIN UR ELPH-MCNC: 2.7 GM/DL
GLUCOSE SERPL-MCNC: 84 MG/DL (ref 65–99)
HCT VFR BLD AUTO: 41.1 % (ref 34–46.6)
HDLC SERPL-MCNC: 48 MG/DL (ref 40–60)
HGB BLD-MCNC: 13.6 G/DL (ref 12–15.9)
LDLC SERPL CALC-MCNC: 87 MG/DL (ref 0–100)
LDLC/HDLC SERPL: 1.72 {RATIO}
MCH RBC QN AUTO: 31.8 PG (ref 26.6–33)
MCHC RBC AUTO-ENTMCNC: 33.1 G/DL (ref 31.5–35.7)
MCV RBC AUTO: 96 FL (ref 79–97)
PLATELET # BLD AUTO: 193 10*3/MM3 (ref 140–450)
PMV BLD AUTO: 10.4 FL (ref 6–12)
POTASSIUM SERPL-SCNC: 4.1 MMOL/L (ref 3.5–5.2)
PROT SERPL-MCNC: 7 G/DL (ref 6–8.5)
RBC # BLD AUTO: 4.28 10*6/MM3 (ref 3.77–5.28)
SODIUM SERPL-SCNC: 140 MMOL/L (ref 136–145)
T4 FREE SERPL-MCNC: 1.02 NG/DL (ref 0.93–1.7)
TRIGL SERPL-MCNC: 168 MG/DL (ref 0–150)
TSH SERPL DL<=0.05 MIU/L-ACNC: 0.88 UIU/ML (ref 0.27–4.2)
VLDLC SERPL-MCNC: 29 MG/DL (ref 5–40)
WBC NRBC COR # BLD: 4.96 10*3/MM3 (ref 3.4–10.8)

## 2022-07-24 PROCEDURE — 82306 VITAMIN D 25 HYDROXY: CPT

## 2022-07-24 PROCEDURE — 85007 BL SMEAR W/DIFF WBC COUNT: CPT

## 2022-07-24 PROCEDURE — 84443 ASSAY THYROID STIM HORMONE: CPT

## 2022-07-24 PROCEDURE — 84439 ASSAY OF FREE THYROXINE: CPT

## 2022-07-24 PROCEDURE — 80053 COMPREHEN METABOLIC PANEL: CPT

## 2022-07-24 PROCEDURE — 36415 COLL VENOUS BLD VENIPUNCTURE: CPT

## 2022-07-24 PROCEDURE — 85027 COMPLETE CBC AUTOMATED: CPT

## 2022-07-24 PROCEDURE — 80061 LIPID PANEL: CPT

## 2022-07-25 LAB
EOSINOPHIL # BLD MANUAL: 0.1 10*3/MM3 (ref 0–0.4)
EOSINOPHIL NFR BLD MANUAL: 2.1 % (ref 0.3–6.2)
LYMPHOCYTES # BLD MANUAL: 2.15 10*3/MM3 (ref 0.7–3.1)
LYMPHOCYTES NFR BLD MANUAL: 6.4 % (ref 5–12)
METAMYELOCYTES NFR BLD MANUAL: 1.1 % (ref 0–0)
MONOCYTES # BLD: 0.3 10*3/MM3 (ref 0.1–0.9)
NEUTROPHILS # BLD AUTO: 2.1 10*3/MM3 (ref 1.7–7)
NEUTROPHILS NFR BLD MANUAL: 44.7 % (ref 42.7–76)
PLAT MORPH BLD: NORMAL
RBC MORPH BLD: NORMAL
VARIANT LYMPHS NFR BLD MANUAL: 45.7 % (ref 19.6–45.3)
WBC MORPH BLD: NORMAL

## 2022-12-03 ENCOUNTER — TRANSCRIBE ORDERS (OUTPATIENT)
Dept: TELEMETRY | Facility: HOSPITAL | Age: 47
End: 2022-12-03

## 2022-12-03 ENCOUNTER — LAB (OUTPATIENT)
Dept: LAB | Facility: HOSPITAL | Age: 47
End: 2022-12-03

## 2022-12-03 DIAGNOSIS — I10 ESSENTIAL HYPERTENSION WITH GOAL BLOOD PRESSURE LESS THAN 130/80: ICD-10-CM

## 2022-12-03 DIAGNOSIS — E53.9 VITAMIN B-COMPLEX DEFICIENCY: Primary | ICD-10-CM

## 2022-12-03 DIAGNOSIS — E53.9 VITAMIN B-COMPLEX DEFICIENCY: ICD-10-CM

## 2022-12-03 LAB
25(OH)D3 SERPL-MCNC: 49.2 NG/ML (ref 30–100)
ALBUMIN SERPL-MCNC: 4.34 G/DL (ref 3.5–5.2)
ALBUMIN/GLOB SERPL: 1.6 G/DL
ALP SERPL-CCNC: 85 U/L (ref 39–117)
ALT SERPL W P-5'-P-CCNC: 17 U/L (ref 1–33)
ANION GAP SERPL CALCULATED.3IONS-SCNC: 11.9 MMOL/L (ref 5–15)
AST SERPL-CCNC: 15 U/L (ref 1–32)
BASOPHILS # BLD AUTO: 0.05 10*3/MM3 (ref 0–0.2)
BASOPHILS NFR BLD AUTO: 0.6 % (ref 0–1.5)
BILIRUB SERPL-MCNC: 0.4 MG/DL (ref 0–1.2)
BUN SERPL-MCNC: 12 MG/DL (ref 6–20)
BUN/CREAT SERPL: 16.9 (ref 7–25)
CALCIUM SPEC-SCNC: 9.4 MG/DL (ref 8.6–10.5)
CHLORIDE SERPL-SCNC: 105 MMOL/L (ref 98–107)
CHOLEST SERPL-MCNC: 169 MG/DL (ref 0–200)
CO2 SERPL-SCNC: 24.1 MMOL/L (ref 22–29)
CREAT SERPL-MCNC: 0.71 MG/DL (ref 0.57–1)
DEPRECATED RDW RBC AUTO: 45.9 FL (ref 37–54)
EGFRCR SERPLBLD CKD-EPI 2021: 105.7 ML/MIN/1.73
EOSINOPHIL # BLD AUTO: 0.09 10*3/MM3 (ref 0–0.4)
EOSINOPHIL NFR BLD AUTO: 1.1 % (ref 0.3–6.2)
ERYTHROCYTE [DISTWIDTH] IN BLOOD BY AUTOMATED COUNT: 12.9 % (ref 12.3–15.4)
GLOBULIN UR ELPH-MCNC: 2.7 GM/DL
GLUCOSE SERPL-MCNC: 95 MG/DL (ref 65–99)
HCT VFR BLD AUTO: 41.4 % (ref 34–46.6)
HDLC SERPL-MCNC: 63 MG/DL (ref 40–60)
HGB BLD-MCNC: 13.9 G/DL (ref 12–15.9)
IMM GRANULOCYTES # BLD AUTO: 0.02 10*3/MM3 (ref 0–0.05)
IMM GRANULOCYTES NFR BLD AUTO: 0.2 % (ref 0–0.5)
LDLC SERPL CALC-MCNC: 85 MG/DL (ref 0–100)
LDLC/HDLC SERPL: 1.3 {RATIO}
LYMPHOCYTES # BLD AUTO: 2.45 10*3/MM3 (ref 0.7–3.1)
LYMPHOCYTES NFR BLD AUTO: 29.1 % (ref 19.6–45.3)
MCH RBC QN AUTO: 32.4 PG (ref 26.6–33)
MCHC RBC AUTO-ENTMCNC: 33.6 G/DL (ref 31.5–35.7)
MCV RBC AUTO: 96.5 FL (ref 79–97)
MONOCYTES # BLD AUTO: 0.48 10*3/MM3 (ref 0.1–0.9)
MONOCYTES NFR BLD AUTO: 5.7 % (ref 5–12)
NEUTROPHILS NFR BLD AUTO: 5.33 10*3/MM3 (ref 1.7–7)
NEUTROPHILS NFR BLD AUTO: 63.3 % (ref 42.7–76)
NRBC BLD AUTO-RTO: 0 /100 WBC (ref 0–0.2)
PLATELET # BLD AUTO: 203 10*3/MM3 (ref 140–450)
PMV BLD AUTO: 10.3 FL (ref 6–12)
POTASSIUM SERPL-SCNC: 4.1 MMOL/L (ref 3.5–5.2)
PROT SERPL-MCNC: 7 G/DL (ref 6–8.5)
RBC # BLD AUTO: 4.29 10*6/MM3 (ref 3.77–5.28)
SODIUM SERPL-SCNC: 141 MMOL/L (ref 136–145)
TRIGL SERPL-MCNC: 122 MG/DL (ref 0–150)
VIT B12 BLD-MCNC: 362 PG/ML (ref 211–946)
VLDLC SERPL-MCNC: 21 MG/DL (ref 5–40)
WBC NRBC COR # BLD: 8.42 10*3/MM3 (ref 3.4–10.8)

## 2022-12-03 PROCEDURE — 82306 VITAMIN D 25 HYDROXY: CPT

## 2022-12-03 PROCEDURE — 80061 LIPID PANEL: CPT

## 2022-12-03 PROCEDURE — 82607 VITAMIN B-12: CPT

## 2022-12-03 PROCEDURE — 85025 COMPLETE CBC W/AUTO DIFF WBC: CPT

## 2022-12-03 PROCEDURE — 36415 COLL VENOUS BLD VENIPUNCTURE: CPT

## 2022-12-03 PROCEDURE — 80053 COMPREHEN METABOLIC PANEL: CPT

## 2022-12-06 ENCOUNTER — HOSPITAL ENCOUNTER (OUTPATIENT)
Dept: MAMMOGRAPHY | Facility: HOSPITAL | Age: 47
Discharge: HOME | End: 2022-12-06
Admitting: NURSE PRACTITIONER

## 2022-12-06 DIAGNOSIS — Z12.31 VISIT FOR SCREENING MAMMOGRAM: ICD-10-CM

## 2022-12-06 PROCEDURE — 77067 SCR MAMMO BI INCL CAD: CPT

## 2022-12-06 PROCEDURE — 77067 SCR MAMMO BI INCL CAD: CPT | Performed by: RADIOLOGY

## 2022-12-06 PROCEDURE — 77063 BREAST TOMOSYNTHESIS BI: CPT | Performed by: RADIOLOGY

## 2022-12-06 PROCEDURE — 77063 BREAST TOMOSYNTHESIS BI: CPT

## 2023-04-14 ENCOUNTER — TRANSCRIBE ORDERS (OUTPATIENT)
Dept: LAB | Facility: HOSPITAL | Age: 48
End: 2023-04-14
Payer: COMMERCIAL

## 2023-04-14 ENCOUNTER — LAB (OUTPATIENT)
Dept: LAB | Facility: HOSPITAL | Age: 48
End: 2023-04-14
Payer: COMMERCIAL

## 2023-04-14 DIAGNOSIS — E53.9 VITAMIN B DEFICIENCY: ICD-10-CM

## 2023-04-14 DIAGNOSIS — E78.2 MIXED HYPERLIPIDEMIA: ICD-10-CM

## 2023-04-14 DIAGNOSIS — E55.9 VITAMIN D DEFICIENCY DISEASE: ICD-10-CM

## 2023-04-14 DIAGNOSIS — I10 ESSENTIAL HYPERTENSION, MALIGNANT: ICD-10-CM

## 2023-04-14 DIAGNOSIS — K21.9 GASTRIC REFLUX: ICD-10-CM

## 2023-04-14 DIAGNOSIS — I10 ESSENTIAL HYPERTENSION, MALIGNANT: Primary | ICD-10-CM

## 2023-04-14 PROCEDURE — 36415 COLL VENOUS BLD VENIPUNCTURE: CPT

## 2023-04-14 PROCEDURE — 82306 VITAMIN D 25 HYDROXY: CPT

## 2023-04-14 PROCEDURE — 85025 COMPLETE CBC W/AUTO DIFF WBC: CPT

## 2023-04-14 PROCEDURE — 80053 COMPREHEN METABOLIC PANEL: CPT

## 2023-04-14 PROCEDURE — 82607 VITAMIN B-12: CPT

## 2023-04-14 PROCEDURE — 80061 LIPID PANEL: CPT

## 2023-04-15 LAB
25(OH)D3 SERPL-MCNC: 32.4 NG/ML (ref 30–100)
ALBUMIN SERPL-MCNC: 4.9 G/DL (ref 3.5–5.2)
ALBUMIN/GLOB SERPL: 2.1 G/DL
ALP SERPL-CCNC: 68 U/L (ref 39–117)
ALT SERPL W P-5'-P-CCNC: 22 U/L (ref 1–33)
ANION GAP SERPL CALCULATED.3IONS-SCNC: 13.6 MMOL/L (ref 5–15)
AST SERPL-CCNC: 20 U/L (ref 1–32)
BASOPHILS # BLD AUTO: 0.06 10*3/MM3 (ref 0–0.2)
BASOPHILS NFR BLD AUTO: 1.1 % (ref 0–1.5)
BILIRUB SERPL-MCNC: 0.3 MG/DL (ref 0–1.2)
BUN SERPL-MCNC: 12 MG/DL (ref 6–20)
BUN/CREAT SERPL: 13.2 (ref 7–25)
CALCIUM SPEC-SCNC: 10 MG/DL (ref 8.6–10.5)
CHLORIDE SERPL-SCNC: 104 MMOL/L (ref 98–107)
CHOLEST SERPL-MCNC: 172 MG/DL (ref 0–200)
CO2 SERPL-SCNC: 22.4 MMOL/L (ref 22–29)
CREAT SERPL-MCNC: 0.91 MG/DL (ref 0.57–1)
DEPRECATED RDW RBC AUTO: 44.7 FL (ref 37–54)
EGFRCR SERPLBLD CKD-EPI 2021: 78 ML/MIN/1.73
EOSINOPHIL # BLD AUTO: 0.16 10*3/MM3 (ref 0–0.4)
EOSINOPHIL NFR BLD AUTO: 2.9 % (ref 0.3–6.2)
ERYTHROCYTE [DISTWIDTH] IN BLOOD BY AUTOMATED COUNT: 12.9 % (ref 12.3–15.4)
GLOBULIN UR ELPH-MCNC: 2.3 GM/DL
GLUCOSE SERPL-MCNC: 94 MG/DL (ref 65–99)
HCT VFR BLD AUTO: 40.9 % (ref 34–46.6)
HDLC SERPL-MCNC: 58 MG/DL (ref 40–60)
HGB BLD-MCNC: 14.2 G/DL (ref 12–15.9)
IMM GRANULOCYTES # BLD AUTO: 0.01 10*3/MM3 (ref 0–0.05)
IMM GRANULOCYTES NFR BLD AUTO: 0.2 % (ref 0–0.5)
LDLC SERPL CALC-MCNC: 98 MG/DL (ref 0–100)
LDLC/HDLC SERPL: 1.68 {RATIO}
LYMPHOCYTES # BLD AUTO: 2.28 10*3/MM3 (ref 0.7–3.1)
LYMPHOCYTES NFR BLD AUTO: 40.7 % (ref 19.6–45.3)
MCH RBC QN AUTO: 33 PG (ref 26.6–33)
MCHC RBC AUTO-ENTMCNC: 34.7 G/DL (ref 31.5–35.7)
MCV RBC AUTO: 95.1 FL (ref 79–97)
MONOCYTES # BLD AUTO: 0.46 10*3/MM3 (ref 0.1–0.9)
MONOCYTES NFR BLD AUTO: 8.2 % (ref 5–12)
NEUTROPHILS NFR BLD AUTO: 2.63 10*3/MM3 (ref 1.7–7)
NEUTROPHILS NFR BLD AUTO: 46.9 % (ref 42.7–76)
NRBC BLD AUTO-RTO: 0.2 /100 WBC (ref 0–0.2)
PLATELET # BLD AUTO: 203 10*3/MM3 (ref 140–450)
PMV BLD AUTO: 11.7 FL (ref 6–12)
POTASSIUM SERPL-SCNC: 4.4 MMOL/L (ref 3.5–5.2)
PROT SERPL-MCNC: 7.2 G/DL (ref 6–8.5)
RBC # BLD AUTO: 4.3 10*6/MM3 (ref 3.77–5.28)
SODIUM SERPL-SCNC: 140 MMOL/L (ref 136–145)
TRIGL SERPL-MCNC: 84 MG/DL (ref 0–150)
VIT B12 BLD-MCNC: 370 PG/ML (ref 211–946)
VLDLC SERPL-MCNC: 16 MG/DL (ref 5–40)
WBC NRBC COR # BLD: 5.6 10*3/MM3 (ref 3.4–10.8)

## 2023-11-15 ENCOUNTER — TRANSCRIBE ORDERS (OUTPATIENT)
Dept: LAB | Facility: HOSPITAL | Age: 48
End: 2023-11-15
Payer: COMMERCIAL

## 2023-11-15 ENCOUNTER — LAB (OUTPATIENT)
Dept: LAB | Facility: HOSPITAL | Age: 48
End: 2023-11-15
Payer: COMMERCIAL

## 2023-11-15 DIAGNOSIS — E78.2 MIXED HYPERLIPIDEMIA: ICD-10-CM

## 2023-11-15 DIAGNOSIS — E55.9 VITAMIN D DEFICIENCY DISEASE: ICD-10-CM

## 2023-11-15 DIAGNOSIS — E53.9 VITAMIN B DEFICIENCY: ICD-10-CM

## 2023-11-15 DIAGNOSIS — K21.9 GASTROESOPHAGEAL REFLUX DISEASE, UNSPECIFIED WHETHER ESOPHAGITIS PRESENT: ICD-10-CM

## 2023-11-15 DIAGNOSIS — I10 ESSENTIAL HYPERTENSION, MALIGNANT: Primary | ICD-10-CM

## 2023-11-15 DIAGNOSIS — I10 ESSENTIAL HYPERTENSION, MALIGNANT: ICD-10-CM

## 2023-11-15 PROCEDURE — 82607 VITAMIN B-12: CPT

## 2023-11-15 PROCEDURE — 85025 COMPLETE CBC W/AUTO DIFF WBC: CPT

## 2023-11-15 PROCEDURE — 36415 COLL VENOUS BLD VENIPUNCTURE: CPT

## 2023-11-15 PROCEDURE — 80061 LIPID PANEL: CPT

## 2023-11-15 PROCEDURE — 82306 VITAMIN D 25 HYDROXY: CPT

## 2023-11-15 PROCEDURE — 80053 COMPREHEN METABOLIC PANEL: CPT

## 2023-11-16 LAB
25(OH)D3 SERPL-MCNC: 39.2 NG/ML (ref 30–100)
ALBUMIN SERPL-MCNC: 4.5 G/DL (ref 3.5–5.2)
ALBUMIN/GLOB SERPL: 2 G/DL
ALP SERPL-CCNC: 76 U/L (ref 39–117)
ALT SERPL W P-5'-P-CCNC: 19 U/L (ref 1–33)
ANION GAP SERPL CALCULATED.3IONS-SCNC: 9.4 MMOL/L (ref 5–15)
AST SERPL-CCNC: 19 U/L (ref 1–32)
BASOPHILS # BLD AUTO: 0.05 10*3/MM3 (ref 0–0.2)
BASOPHILS NFR BLD AUTO: 0.7 % (ref 0–1.5)
BILIRUB SERPL-MCNC: 0.3 MG/DL (ref 0–1.2)
BUN SERPL-MCNC: 12 MG/DL (ref 6–20)
BUN/CREAT SERPL: 17.4 (ref 7–25)
CALCIUM SPEC-SCNC: 9.2 MG/DL (ref 8.6–10.5)
CHLORIDE SERPL-SCNC: 107 MMOL/L (ref 98–107)
CHOLEST SERPL-MCNC: 159 MG/DL (ref 0–200)
CO2 SERPL-SCNC: 26.6 MMOL/L (ref 22–29)
CREAT SERPL-MCNC: 0.69 MG/DL (ref 0.57–1)
DEPRECATED RDW RBC AUTO: 44.7 FL (ref 37–54)
EGFRCR SERPLBLD CKD-EPI 2021: 107.2 ML/MIN/1.73
EOSINOPHIL # BLD AUTO: 0.13 10*3/MM3 (ref 0–0.4)
EOSINOPHIL NFR BLD AUTO: 1.9 % (ref 0.3–6.2)
ERYTHROCYTE [DISTWIDTH] IN BLOOD BY AUTOMATED COUNT: 12.8 % (ref 12.3–15.4)
GLOBULIN UR ELPH-MCNC: 2.3 GM/DL
GLUCOSE SERPL-MCNC: 85 MG/DL (ref 65–99)
HCT VFR BLD AUTO: 39.3 % (ref 34–46.6)
HDLC SERPL-MCNC: 56 MG/DL (ref 40–60)
HGB BLD-MCNC: 13.2 G/DL (ref 12–15.9)
IMM GRANULOCYTES # BLD AUTO: 0.02 10*3/MM3 (ref 0–0.05)
IMM GRANULOCYTES NFR BLD AUTO: 0.3 % (ref 0–0.5)
LDLC SERPL CALC-MCNC: 80 MG/DL (ref 0–100)
LDLC/HDLC SERPL: 1.37 {RATIO}
LYMPHOCYTES # BLD AUTO: 2.05 10*3/MM3 (ref 0.7–3.1)
LYMPHOCYTES NFR BLD AUTO: 30.2 % (ref 19.6–45.3)
MCH RBC QN AUTO: 32 PG (ref 26.6–33)
MCHC RBC AUTO-ENTMCNC: 33.6 G/DL (ref 31.5–35.7)
MCV RBC AUTO: 95.4 FL (ref 79–97)
MONOCYTES # BLD AUTO: 0.42 10*3/MM3 (ref 0.1–0.9)
MONOCYTES NFR BLD AUTO: 6.2 % (ref 5–12)
NEUTROPHILS NFR BLD AUTO: 4.12 10*3/MM3 (ref 1.7–7)
NEUTROPHILS NFR BLD AUTO: 60.7 % (ref 42.7–76)
NRBC BLD AUTO-RTO: 0 /100 WBC (ref 0–0.2)
PLATELET # BLD AUTO: 203 10*3/MM3 (ref 140–450)
PMV BLD AUTO: 11.5 FL (ref 6–12)
POTASSIUM SERPL-SCNC: 4.3 MMOL/L (ref 3.5–5.2)
PROT SERPL-MCNC: 6.8 G/DL (ref 6–8.5)
RBC # BLD AUTO: 4.12 10*6/MM3 (ref 3.77–5.28)
SODIUM SERPL-SCNC: 143 MMOL/L (ref 136–145)
TRIGL SERPL-MCNC: 132 MG/DL (ref 0–150)
VIT B12 BLD-MCNC: 288 PG/ML (ref 211–946)
VLDLC SERPL-MCNC: 23 MG/DL (ref 5–40)
WBC NRBC COR # BLD: 6.79 10*3/MM3 (ref 3.4–10.8)

## 2023-11-30 ENCOUNTER — TRANSCRIBE ORDERS (OUTPATIENT)
Dept: ADMINISTRATIVE | Facility: HOSPITAL | Age: 48
End: 2023-11-30
Payer: COMMERCIAL

## 2023-11-30 DIAGNOSIS — M54.2 CERVICALGIA: Primary | ICD-10-CM

## 2023-12-23 ENCOUNTER — HOSPITAL ENCOUNTER (OUTPATIENT)
Dept: MRI IMAGING | Facility: HOSPITAL | Age: 48
Discharge: HOME | End: 2023-12-23
Payer: COMMERCIAL

## 2023-12-23 DIAGNOSIS — M54.2 CERVICALGIA: ICD-10-CM

## 2023-12-23 PROCEDURE — 72141 MRI NECK SPINE W/O DYE: CPT

## 2024-03-05 ENCOUNTER — OFFICE VISIT (OUTPATIENT)
Dept: CARDIOLOGY | Facility: CLINIC | Age: 49
End: 2024-03-05
Payer: COMMERCIAL

## 2024-03-05 VITALS
DIASTOLIC BLOOD PRESSURE: 80 MMHG | BODY MASS INDEX: 34.12 KG/M2 | HEIGHT: 65 IN | HEART RATE: 112 BPM | SYSTOLIC BLOOD PRESSURE: 126 MMHG | WEIGHT: 204.8 LBS | OXYGEN SATURATION: 97 %

## 2024-03-05 DIAGNOSIS — R07.2 PRECORDIAL PAIN: ICD-10-CM

## 2024-03-05 DIAGNOSIS — R00.2 PALPITATIONS: Primary | ICD-10-CM

## 2024-03-05 PROCEDURE — 93000 ELECTROCARDIOGRAM COMPLETE: CPT | Performed by: INTERNAL MEDICINE

## 2024-03-05 PROCEDURE — 99204 OFFICE O/P NEW MOD 45 MIN: CPT | Performed by: INTERNAL MEDICINE

## 2024-03-05 NOTE — PROGRESS NOTES
[facility]  Consultation H&P  Rosie Cruz  1975  [phone] (work).    VISIT DATE:  03/05/2024    PCP: Thom Rea MD  [address]  [facility] [address]    CC:  Chief Complaint   Patient presents with    Chest Pain     NEW PATIENT    Irregular Heart Beat         ASSESSMENT:   Diagnosis Plan   1. Palpitations  Holter Monitor - 72 Hour Up To 15 Days      2. Precordial pain  Treadmill Stress Test            PLAN:  2-week Holter monitor for symptom rhythm correlation  Exercise treadmill test for ischemia evaluation.  If unremarkable may consider coronary calcium score to help further quantify underlying cardiac risk given family history of early CAD.  Continue bisoprolol 5 mg p.o. daily, trial of backing off on her dose from bedtime to dinnertime to see if that will help suppress her bedtime palpitations.  Goal LDL less than 100, excellent control, continue current Livalo 2 mg p.o. daily.    History of Present Illness   49-year-old female with a history of palpitations and dyslipidemia and family history of early coronary disease in her father.  Presents with episodes of nocturnal palpitations.  Describes brief racing heartbeat sensations when she first lies down in bed and intermittent flip-flopping sensations.  Has been more prominent over the past 4 to 6 weeks.  She has had at least 2 episodes of left lower jaw discomfort radiating to her left shoulder.  1 of these episodes occurred at rest while she was at work.  No obvious triggers.  No alleviating or exacerbating features.  Another episode was induced with exertion after carrying moderate weight up 3 flights of stairs.  Relieved with rest.  Symptoms can last up to 30 minutes.  Blood pressures generally less than 130/80 mmHg.  She had been started on bisoprolol several years ago for benign palpitations.  Compliant with Livalo, other statin alternatives induced myalgias.    PHYSICAL  "EXAMINATION:  Vitals:    03/05/24 1504   BP: 126/80   BP Location: Right arm   Patient Position: Sitting   Pulse: 112   SpO2: 97%   Weight: 92.9 kg (204 lb 12.8 oz)   Height: 165.1 cm (65\")     General Appearance:    Alert, cooperative, no distress, appears stated age   Head:    Normocephalic, without obvious abnormality, atraumatic   Eyes:    conjunctiva/corneas clear, EOM's intact, fundi     benign, both eyes   Ears:    Normal TM's and external ear canals, both ears   Nose:   Nares normal, septum midline, mucosa normal, no drainage    or sinus tenderness   Throat:   Lips, mucosa, and tongue normal; teeth and gums normal   Neck:   Supple, symmetrical, trachea midline, no adenopathy;     thyroid:  no enlargement/tenderness/nodules; no carotid    bruit or JVD   Back:     Symmetric, no curvature, ROM normal, no CVA tenderness   Lungs:     Clear to auscultation bilaterally, respirations unlabored   Chest Wall:    No tenderness or deformity    Heart:    Regular rate and rhythm, S1 and S2 normal, no murmur, rub   or gallop, normal carotid impulse bilaterally without bruit.   Abdomen:     Soft, non-tender, bowel sounds active all four quadrants,     no masses, no organomegaly   Extremities:   Extremities normal, atraumatic, no cyanosis or edema   Pulses:   2+ and symmetric all extremities   Skin:   Skin color, texture, turgor normal, no rashes or lesions   Lymph nodes:   Cervical, supraclavicular, and axillary nodes normal   Neurologic:   normal strength, sensation intact     throughout       Diagnostic Data:    ECG 12 Lead    Date/Time: 3/5/2024 3:30 PM  Performed by: Tommy Alex III, MD    Authorized by: Tommy Alex III, MD  Comparison: compared with previous ECG from 1/23/2024  Rhythm: sinus rhythm  Conduction: incomplete right bundle branch block    Clinical impression: non-specific ECG        Lab Results   Component Value Date    CHLPL 183 09/12/2014    TRIG 132 11/15/2023    HDL 56 11/15/2023     Lab Results " "  Component Value Date    GLUCOSE 85 11/15/2023    BUN 12 11/15/2023    CREATININE 0.69 11/15/2023     11/15/2023    K 4.3 11/15/2023     11/15/2023    CO2 26.6 11/15/2023     No results found for: \"HGBA1C\"  Lab Results   Component Value Date    WBC 6.79 11/15/2023    HGB 13.2 11/15/2023    HCT 39.3 11/15/2023     11/15/2023       PROBLEM LIST:  Patient Active Problem List   Diagnosis    Right foot pain       PAST MEDICAL HX  Past Medical History:   Diagnosis Date    Abnormal ECG 2015    Bystolic    Arrhythmia 2015    Fibroid 07/2011    removed via hysterectomy    GERD (gastroesophageal reflux disease)     Heart murmur Birth    High cholesterol     History of Holter monitoring     48 HOUR SEVERAL YEARS AGO    Migraine 01/2021    tried medication    PMS (premenstrual syndrome) 11/1986    [facility] - Hysterectomy cured    PONV (postoperative nausea and vomiting) 2003    Varicella 03/1982       Allergies  Allergies   Allergen Reactions    Penicillins Other (See Comments)     CHILDHOOD       Current Medications    Current Outpatient Medications:     Coenzyme Q10 (COQ-10 PO), Take  by mouth Every Night. GUMMIE, Disp: , Rfl:     multivitamin with minerals (Hair Skin and Nails Formula) tablet tablet, Take 1 tablet by mouth Daily. GUMMIE AT NIGHT, Disp: , Rfl:     nebivolol (BYSTOLIC) 5 MG tablet, Take 1 tablet by mouth Daily., Disp: 90 tablet, Rfl: 3    pitavastatin calcium (Livalo) 2 MG tablet tablet, Take 1 tablet (2 mg) by mouth once daily (Patient taking differently: Take  by mouth Every Other Day.), Disp: 30 tablet, Rfl: 11    vitamin D (ERGOCALCIFEROL) 1.25 MG (83331 UT) capsule capsule, Take 1 capsule by mouth weekly, Disp: 12 capsule, Rfl: 3         ROS  ROS      SOCIAL HX  Social History     Socioeconomic History    Marital status:    Tobacco Use    Smoking status: Never    Smokeless tobacco: Never   Vaping Use    Vaping status: Never Used   Substance and Sexual Activity    Alcohol use: Yes "     Alcohol/week: 1.0 standard drink of alcohol     Types: 1 Standard drinks or equivalent per week     Comment: 1-2/week    Drug use: No    Sexual activity: Yes     Partners: Male     Birth control/protection: Hysterectomy     Comment: Partial       FAMILY HX  Family History   Problem Relation Age of Onset    Cancer Mother     Osteoarthritis Mother     Hypertension Mother     Deep vein thrombosis Mother     Pulmonary embolism Mother     Stroke Mother     Osteoporosis Mother     Asthma Mother     Cancer Father     Hypertension Father     Colon cancer Father     Heart attack Father     Hyperlipidemia Father     Gout Maternal Grandfather     Cancer Maternal Grandfather     Diabetes Maternal Grandfather     Hypertension Maternal Grandfather     Heart attack Maternal Grandfather     Hyperlipidemia Maternal Grandfather     Diabetes Paternal Grandfather     Breast cancer Neg Hx              Tommy Alex III, MD, FACC

## 2024-03-15 ENCOUNTER — HOSPITAL ENCOUNTER (OUTPATIENT)
Dept: CARDIOLOGY | Facility: HOSPITAL | Age: 49
Discharge: HOME | End: 2024-03-15
Payer: COMMERCIAL

## 2024-03-15 ENCOUNTER — TELEPHONE (OUTPATIENT)
Dept: CARDIOLOGY | Facility: CLINIC | Age: 49
End: 2024-03-15
Payer: COMMERCIAL

## 2024-03-15 LAB
BH CV STRESS BP STAGE 1: NORMAL
BH CV STRESS BP STAGE 2: NORMAL
BH CV STRESS DURATION MIN STAGE 1: 3
BH CV STRESS DURATION MIN STAGE 2: 3
BH CV STRESS DURATION MIN STAGE 3: 3
BH CV STRESS DURATION SEC STAGE 1: 0
BH CV STRESS DURATION SEC STAGE 2: 0
BH CV STRESS DURATION SEC STAGE 3: 0
BH CV STRESS GRADE STAGE 1: 10
BH CV STRESS GRADE STAGE 2: 12
BH CV STRESS GRADE STAGE 3: 14
BH CV STRESS HR STAGE 1: 122
BH CV STRESS HR STAGE 2: 153
BH CV STRESS METS STAGE 1: 5
BH CV STRESS METS STAGE 2: 7.5
BH CV STRESS METS STAGE 3: 10
BH CV STRESS PROTOCOL 1: NORMAL
BH CV STRESS RECOVERY BP: NORMAL MMHG
BH CV STRESS RECOVERY HR: 98 BPM
BH CV STRESS SPEED STAGE 1: 1.7
BH CV STRESS SPEED STAGE 2: 2.5
BH CV STRESS SPEED STAGE 3: 3.4
BH CV STRESS STAGE 1: 1
BH CV STRESS STAGE 2: 2
BH CV STRESS STAGE 3: 3
MAXIMAL PREDICTED HEART RATE: 171 BPM
PERCENT MAX PREDICTED HR: 89.47 %
STRESS BASELINE BP: NORMAL MMHG
STRESS BASELINE HR: 77 BPM
STRESS PERCENT HR: 105 %
STRESS POST ESTIMATED WORKLOAD: 7 METS
STRESS POST EXERCISE DUR MIN: 6 MIN
STRESS POST PEAK BP: NORMAL MMHG
STRESS POST PEAK HR: 153 BPM
STRESS TARGET HR: 145 BPM

## 2024-03-15 PROCEDURE — 93017 CV STRESS TEST TRACING ONLY: CPT

## 2024-04-16 ENCOUNTER — TELEPHONE (OUTPATIENT)
Dept: CARDIOLOGY | Facility: CLINIC | Age: 49
End: 2024-04-16
Payer: COMMERCIAL

## 2024-04-25 ENCOUNTER — HOSPITAL ENCOUNTER (OUTPATIENT)
Dept: GENERAL RADIOLOGY | Facility: HOSPITAL | Age: 49
Discharge: HOME | End: 2024-04-25
Payer: COMMERCIAL

## 2024-04-25 ENCOUNTER — OFFICE VISIT (OUTPATIENT)
Dept: ORTHOPEDIC SURGERY | Facility: CLINIC | Age: 49
End: 2024-04-25
Payer: COMMERCIAL

## 2024-04-25 VITALS
OXYGEN SATURATION: 97 % | HEART RATE: 72 BPM | HEIGHT: 65 IN | SYSTOLIC BLOOD PRESSURE: 124 MMHG | WEIGHT: 204.12 LBS | DIASTOLIC BLOOD PRESSURE: 65 MMHG | BODY MASS INDEX: 34.01 KG/M2

## 2024-04-25 DIAGNOSIS — M25.571 ACUTE RIGHT ANKLE PAIN: Primary | ICD-10-CM

## 2024-04-25 DIAGNOSIS — S93.491A SPRAIN OF ANTERIOR TALOFIBULAR LIGAMENT OF RIGHT ANKLE, INITIAL ENCOUNTER: ICD-10-CM

## 2024-04-25 DIAGNOSIS — M79.671 ACUTE FOOT PAIN, RIGHT: ICD-10-CM

## 2024-04-25 DIAGNOSIS — M62.838 MUSCLE SPASM: ICD-10-CM

## 2024-04-25 DIAGNOSIS — M25.571 RIGHT ANKLE PAIN, UNSPECIFIED CHRONICITY: ICD-10-CM

## 2024-04-25 DIAGNOSIS — M25.471 ANKLE SWELLING, RIGHT: ICD-10-CM

## 2024-04-25 DIAGNOSIS — M79.10 MUSCLE PAIN: ICD-10-CM

## 2024-04-25 PROCEDURE — 73630 X-RAY EXAM OF FOOT: CPT

## 2024-04-25 PROCEDURE — 73610 X-RAY EXAM OF ANKLE: CPT

## 2024-04-25 PROCEDURE — 73630 X-RAY EXAM OF FOOT: CPT | Performed by: RADIOLOGY

## 2024-04-25 PROCEDURE — 73610 X-RAY EXAM OF ANKLE: CPT | Performed by: RADIOLOGY

## 2024-04-25 PROCEDURE — 99204 OFFICE O/P NEW MOD 45 MIN: CPT | Performed by: FAMILY MEDICINE

## 2024-04-25 NOTE — PROGRESS NOTES
New Patient Visit      Patient: Rosie Cruz  YOB: 1975  Date of Encounter: 04/25/2024  PCP: Thom Rea MD  Referring Provider: No ref. provider found     Subjective   Rosie Cruz is a 49 y.o. female who presents to the office today for evaluation of Initial Evaluation, Pain, and Edema of the Right Ankle      Chief Complaint   Patient presents with    Right Ankle - Initial Evaluation, Pain, Edema       HPI  New patient presents complaining of right ankle pain and swelling after a fall earlier today.  Patient tripped over a water hose in her yard this morning and rolled her right ankle inward.  States that it popped and swelled up immediately.  Getting pain in the lateral and posterior foot.  Patient is able to walk on it but it is painful  Patient Active Problem List   Diagnosis    Right foot pain       Past Medical History:   Diagnosis Date    Abnormal ECG 2015    Bystolic    Arrhythmia 2015    Fibroid 07/2011    removed via hysterectomy    GERD (gastroesophageal reflux disease)     Heart murmur Birth    High cholesterol     History of Holter monitoring     48 HOUR SEVERAL YEARS AGO    Migraine 01/2021    tried medication    PMS (premenstrual syndrome) 11/1986    [facility] - Hysterectomy cured    PONV (postoperative nausea and vomiting) 2003    Varicella 03/1982       Past Surgical History:   Procedure Laterality Date    CHOLECYSTECTOMY      COLONOSCOPY  08/11/2021    Dr Germain-Kalen    HYSTERECTOMY      LAPAROSCOPIC CHOLECYSTECTOMY  2003    VAGINAL HYSTERECTOMY  08/2011    WISDOM TOOTH EXTRACTION  1993       Family History   Problem Relation Age of Onset    Cancer Mother     Osteoarthritis Mother     Hypertension Mother     Deep vein thrombosis Mother     Pulmonary embolism Mother     Stroke Mother     Osteoporosis Mother     Asthma Mother     Cancer Father     Hypertension Father     Colon cancer Father     Heart attack Father     Hyperlipidemia Father     Gout Maternal Grandfather      Cancer Maternal Grandfather     Diabetes Maternal Grandfather     Hypertension Maternal Grandfather     Heart attack Maternal Grandfather     Hyperlipidemia Maternal Grandfather     Diabetes Paternal Grandfather     Breast cancer Neg Hx        Social History     Socioeconomic History    Marital status:    Tobacco Use    Smoking status: Never    Smokeless tobacco: Never   Vaping Use    Vaping status: Never Used   Substance and Sexual Activity    Alcohol use: Yes     Alcohol/week: 1.0 standard drink of alcohol     Types: 1 Standard drinks or equivalent per week     Comment: 1-2/week    Drug use: No    Sexual activity: Yes     Partners: Male     Birth control/protection: Hysterectomy     Comment: Partial       Current Outpatient Medications   Medication Sig Dispense Refill    Coenzyme Q10 (COQ-10 PO) Take  by mouth Every Night. GUMMIE      multivitamin with minerals (Hair Skin and Nails Formula) tablet tablet Take 1 tablet by mouth Daily. GUMMIE AT NIGHT      nebivolol (BYSTOLIC) 5 MG tablet Take 1 tablet by mouth Daily. 90 tablet 3    pitavastatin calcium (Livalo) 2 MG tablet tablet Take 1 tablet (2 mg) by mouth once daily (Patient taking differently: Take  by mouth Every Other Day.) 30 tablet 11    vitamin D (ERGOCALCIFEROL) 1.25 MG (23407 UT) capsule capsule Take 1 capsule by mouth weekly 12 capsule 3    Diclofenac Sodium (VOLTAREN) 1 % gel gel Apply 4 g topically to the appropriate area as directed 4 (Four) Times a Day As Needed (right foot and ankle). 150 g 2     No current facility-administered medications for this visit.       Allergies   Allergen Reactions    Penicillins Other (See Comments)     CHILDHOOD            Review of Systems   Constitutional:  Positive for activity change. Negative for fever.   Respiratory:  Negative for shortness of breath and wheezing.    Cardiovascular:  Negative for chest pain.   Musculoskeletal:  Positive for arthralgias, gait problem, joint swelling and myalgias.   Skin:   "Negative for color change and wound.   Neurological:  Negative for weakness and numbness.       Visit Vitals  /65 (BP Location: Left arm, Patient Position: Sitting, Cuff Size: Adult)   Pulse 72   Ht 165.1 cm (65\")   Wt 92.6 kg (204 lb 1.9 oz)   SpO2 97%   BMI 33.97 kg/m²     Estimated body mass index is 33.97 kg/m² as calculated from the following:    Height as of this encounter: 165.1 cm (65\").    Weight as of this encounter: 92.6 kg (204 lb 1.9 oz).  49 y.o.female  Physical Exam  Vitals and nursing note reviewed.   Constitutional:       General: She is not in acute distress.     Appearance: Normal appearance.   Pulmonary:      Effort: Pulmonary effort is normal. No respiratory distress.   Musculoskeletal:      Right ankle: Swelling present. Tenderness present over the lateral malleolus (tip), ATF ligament and base of 5th metatarsal. No CF ligament or proximal fibula tenderness. Decreased range of motion. Anterior drawer test negative. Normal pulse.      Right Achilles Tendon: Tenderness present. No defects. Aguila's test negative.        Feet:       Comments: Large swelling over ATFL with mild ecchymosis. Mildly decreased ROM and intact strength.    Negative syndesmosis squeeze test.  Negative talar tilt test   Skin:     General: Skin is warm and dry.      Findings: No erythema.   Neurological:      General: No focal deficit present.      Mental Status: She is alert.      Sensory: No sensory deficit.      Motor: No weakness.         Radiology Results:    XR Ankle 3+ View Right    Result Date: 4/25/2024    No acute fracture or dislocation.   This report was finalized on 4/25/2024 12:55 PM by Dr. Bam Small MD.     My interpretation: Concern for small chip avulsion fracture of lateral malleolus of questionable age    Assessment & Plan   Diagnoses and all orders for this visit:    1. Acute right ankle pain (Primary)  -     XR Ankle 3+ View Right; Future  -     XR Foot 3+ View Right  -     Diclofenac Sodium " (VOLTAREN) 1 % gel gel; Apply 4 g topically to the appropriate area as directed 4 (Four) Times a Day As Needed (right foot and ankle).  Dispense: 150 g; Refill: 2    2. Acute foot pain, right  -     XR Foot 3+ View Right  -     Diclofenac Sodium (VOLTAREN) 1 % gel gel; Apply 4 g topically to the appropriate area as directed 4 (Four) Times a Day As Needed (right foot and ankle).  Dispense: 150 g; Refill: 2    3. Ankle swelling, right  -     Diclofenac Sodium (VOLTAREN) 1 % gel gel; Apply 4 g topically to the appropriate area as directed 4 (Four) Times a Day As Needed (right foot and ankle).  Dispense: 150 g; Refill: 2    4. Sprain of anterior talofibular ligament of right ankle, initial encounter  -     Diclofenac Sodium (VOLTAREN) 1 % gel gel; Apply 4 g topically to the appropriate area as directed 4 (Four) Times a Day As Needed (right foot and ankle).  Dispense: 150 g; Refill: 2    5. Muscle pain  -     Diclofenac Sodium (VOLTAREN) 1 % gel gel; Apply 4 g topically to the appropriate area as directed 4 (Four) Times a Day As Needed (right foot and ankle).  Dispense: 150 g; Refill: 2    6. Muscle spasm  -     Diclofenac Sodium (VOLTAREN) 1 % gel gel; Apply 4 g topically to the appropriate area as directed 4 (Four) Times a Day As Needed (right foot and ankle).  Dispense: 150 g; Refill: 2         MEDS ORDERED DURING VISIT:  New Medications Ordered This Visit   Medications    Diclofenac Sodium (VOLTAREN) 1 % gel gel     Sig: Apply 4 g topically to the appropriate area as directed 4 (Four) Times a Day As Needed (right foot and ankle).     Dispense:  150 g     Refill:  2     MEDICATION ISSUES:  Discussed medication options and treatment plan of prescribed medication as well as the risks, benefits, and side effects including potential falls, possible impaired driving and metabolic adversities among others. Patient is agreeable to call the office with any worsening of symptoms or onset of side effects. Patient is agreeable  to call 911 or go to the nearest ER should he/she begin having SI/HI.     Discussion:  Suffered a right ATFL sprain with questionable chip avulsion fracture from the lateral malleolus.  This may represent an old injury the patient is point tender at the area and is significant swelling over the ATFL.  Patient very tender over fifth metatarsal head as well and will get foot x-rays to fully evaluate this area.  Patient placed in a walking boot for comfort and will use topical Voltaren, Tylenol and icing for pain relief.  Follow-up in 1 week for reevaluation             This document has been electronically signed by Jesus Saucedo DO   April 25, 2024 15:13 EDT    Part of this note may be an electronic transcription/translation of spoken language to printed text using the Dragon Dictation System.

## 2024-05-01 PROBLEM — E66.9 OBESITY (BMI 30.0-34.9): Status: ACTIVE | Noted: 2024-05-01

## 2024-05-01 PROBLEM — E66.811 OBESITY (BMI 30.0-34.9): Status: ACTIVE | Noted: 2024-05-01

## 2024-05-02 ENCOUNTER — OFFICE VISIT (OUTPATIENT)
Dept: ORTHOPEDIC SURGERY | Facility: CLINIC | Age: 49
End: 2024-05-02
Payer: COMMERCIAL

## 2024-05-02 ENCOUNTER — TELEPHONE (OUTPATIENT)
Dept: ORTHOPEDIC SURGERY | Facility: CLINIC | Age: 49
End: 2024-05-02
Payer: COMMERCIAL

## 2024-05-02 ENCOUNTER — HOSPITAL ENCOUNTER (OUTPATIENT)
Dept: GENERAL RADIOLOGY | Facility: HOSPITAL | Age: 49
Discharge: HOME | End: 2024-05-02
Admitting: FAMILY MEDICINE
Payer: COMMERCIAL

## 2024-05-02 VITALS
HEIGHT: 65 IN | WEIGHT: 204.19 LBS | DIASTOLIC BLOOD PRESSURE: 89 MMHG | SYSTOLIC BLOOD PRESSURE: 146 MMHG | OXYGEN SATURATION: 99 % | HEART RATE: 60 BPM | BODY MASS INDEX: 34.02 KG/M2

## 2024-05-02 DIAGNOSIS — M25.471 ANKLE SWELLING, RIGHT: ICD-10-CM

## 2024-05-02 DIAGNOSIS — M79.10 MUSCLE PAIN: ICD-10-CM

## 2024-05-02 DIAGNOSIS — M25.571 ACUTE RIGHT ANKLE PAIN: Primary | ICD-10-CM

## 2024-05-02 DIAGNOSIS — S82.831A CLOSED AVULSION FRACTURE OF DISTAL END OF RIGHT FIBULA, INITIAL ENCOUNTER: ICD-10-CM

## 2024-05-02 DIAGNOSIS — M79.671 ACUTE FOOT PAIN, RIGHT: ICD-10-CM

## 2024-05-02 DIAGNOSIS — S93.491A SPRAIN OF ANTERIOR TALOFIBULAR LIGAMENT OF RIGHT ANKLE, INITIAL ENCOUNTER: ICD-10-CM

## 2024-05-02 DIAGNOSIS — M62.838 MUSCLE SPASM: ICD-10-CM

## 2024-05-02 PROCEDURE — 99214 OFFICE O/P EST MOD 30 MIN: CPT | Performed by: FAMILY MEDICINE

## 2024-05-02 PROCEDURE — 73610 X-RAY EXAM OF ANKLE: CPT | Performed by: RADIOLOGY

## 2024-05-02 PROCEDURE — 73610 X-RAY EXAM OF ANKLE: CPT

## 2024-05-02 RX ORDER — IBUPROFEN 600 MG/1
600 TABLET ORAL EVERY 8 HOURS
Start: 2024-05-02

## 2024-05-02 RX ORDER — IBUPROFEN 600 MG/1
600 TABLET ORAL EVERY 6 HOURS PRN
COMMUNITY
End: 2024-05-02 | Stop reason: SDUPTHER

## 2024-05-02 NOTE — PROGRESS NOTES
Please call the patient regarding her x-ray result.  Radiologist confirms a small chip avulsion fracture as we had discussed.  No change to management.  Continue to wear the boot until follow-up

## 2024-05-02 NOTE — PROGRESS NOTES
"Follow Up Visit      Patient: Rosie Cruz  YOB: 1975  Date of Encounter: 05/02/2024  PCP: Thom Rea MD  Referring Provider: No ref. provider found     Subjective   Rosie Cruz is a 49 y.o. female who presents to the office today for evaluation of Follow-up, Pain, and Edema of the Right Ankle      Chief Complaint   Patient presents with    Right Ankle - Follow-up, Pain, Edema       HPI  Patient presents for follow-up for right ankle pain and sprain.  She is now 1 week out from the injury and still getting significant pain and swelling on the lateral ankle and foot.  Notes that she is more comfortable walking in the boot.  Still getting a lot of swelling.  States that the Voltaren gel helps somewhat at night but she has had to take a few doses of ibuprofen during the day  Patient Active Problem List   Diagnosis    Right foot pain    Obesity (BMI 30.0-34.9)       Past Medical History:   Diagnosis Date    Abnormal ECG 2015    Bystolic    Arrhythmia 2015    Fibroid 07/2011    removed via hysterectomy    Fracture of ankle 4/25/24    GERD (gastroesophageal reflux disease)     Heart murmur Birth    High cholesterol     History of Holter monitoring     48 HOUR SEVERAL YEARS AGO    Migraine 01/2021    tried medication    PMS (premenstrual syndrome) 11/1986    [facility] - Hysterectomy cured    PONV (postoperative nausea and vomiting) 2003    Tennis elbow Jan 2018    Varicella 03/1982       Allergies   Allergen Reactions    Penicillins Other (See Comments)     CHILDHOOD       Vitals:   /89 (BP Location: Right arm, Patient Position: Sitting, Cuff Size: Adult)   Pulse 60   Ht 165.1 cm (65\")   Wt 92.6 kg (204 lb 3 oz)   SpO2 99%   BMI 33.98 kg/m²               Visit Vitals  /89 (BP Location: Right arm, Patient Position: Sitting, Cuff Size: Adult)   Pulse 60   Ht 165.1 cm (65\")   Wt 92.6 kg (204 lb 3 oz)   SpO2 99%   BMI 33.98 kg/m²     49 y.o.female  Physical Exam  Vitals and nursing note " reviewed.   Constitutional:       General: She is not in acute distress.     Appearance: Normal appearance.   Pulmonary:      Effort: Pulmonary effort is normal. No respiratory distress.   Musculoskeletal:      Right ankle: Swelling present. Tenderness present over the lateral malleolus (tip), ATF ligament and base of 5th metatarsal. No CF ligament or proximal fibula tenderness. Decreased range of motion. Anterior drawer test negative. Normal pulse.      Right Achilles Tendon: Tenderness present. No defects. Aguila's test negative.        Legs:         Feet:       Comments: Large swelling over ATFL with mild ecchymosis. Mildly decreased ROM and intact strength. Tender ankle mortise,     Negative syndesmosis squeeze test.  Negative talar tilt test  Mild laxity of ATFL with endpoint and pain on testing.   Skin:     General: Skin is warm and dry.      Findings: No erythema.   Neurological:      General: No focal deficit present.      Mental Status: She is alert.      Sensory: No sensory deficit.      Motor: No weakness.         Radiology Results:    XR Foot 3+ View Right    Result Date: 4/26/2024    No acute findings in the right foot.   This report was finalized on 4/26/2024 7:48 AM by Dr. Bam Small MD.      XR Ankle 3+ View Right    Result Date: 4/25/2024    No acute fracture or dislocation.   This report was finalized on 4/25/2024 12:55 PM by Dr. Bam Small MD.       Assessment & Plan   Diagnoses and all orders for this visit:    1. Acute right ankle pain (Primary)  -     XR Ankle 3+ View Right  -     ibuprofen (ADVIL,MOTRIN) 600 MG tablet; Take 1 tablet by mouth Every 8 (Eight) Hours. Patient has med at home already. Should take scheduled Q8h with food until f/u in 2 weeks.    2. Acute foot pain, right  -     XR Ankle 3+ View Right  -     ibuprofen (ADVIL,MOTRIN) 600 MG tablet; Take 1 tablet by mouth Every 8 (Eight) Hours. Patient has med at home already. Should take scheduled Q8h with food until f/u in 2  weeks.    3. Ankle swelling, right  -     XR Ankle 3+ View Right  -     ibuprofen (ADVIL,MOTRIN) 600 MG tablet; Take 1 tablet by mouth Every 8 (Eight) Hours. Patient has med at home already. Should take scheduled Q8h with food until f/u in 2 weeks.    4. Sprain of anterior talofibular ligament of right ankle, initial encounter  -     XR Ankle 3+ View Right  -     ibuprofen (ADVIL,MOTRIN) 600 MG tablet; Take 1 tablet by mouth Every 8 (Eight) Hours. Patient has med at home already. Should take scheduled Q8h with food until f/u in 2 weeks.    5. Muscle pain  -     XR Ankle 3+ View Right  -     ibuprofen (ADVIL,MOTRIN) 600 MG tablet; Take 1 tablet by mouth Every 8 (Eight) Hours. Patient has med at home already. Should take scheduled Q8h with food until f/u in 2 weeks.    6. Muscle spasm  -     XR Ankle 3+ View Right  -     ibuprofen (ADVIL,MOTRIN) 600 MG tablet; Take 1 tablet by mouth Every 8 (Eight) Hours. Patient has med at home already. Should take scheduled Q8h with food until f/u in 2 weeks.    7. Closed avulsion fracture of distal end of right fibula, initial encounter         MEDS ORDERED DURING VISIT:  New Medications Ordered This Visit   Medications    ibuprofen (ADVIL,MOTRIN) 600 MG tablet     Sig: Take 1 tablet by mouth Every 8 (Eight) Hours. Patient has med at home already. Should take scheduled Q8h with food until f/u in 2 weeks.     MEDICATION ISSUES:  Discussed medication options and treatment plan of prescribed medication as well as the risks, benefits, and side effects including potential falls, possible impaired driving and metabolic adversities among others. Patient is agreeable to call the office with any worsening of symptoms or onset of side effects. Patient is agreeable to call 911 or go to the nearest ER should he/she begin having SI/HI.     Discussion:  Continue wearing walking boot. Try oral nsaids  Repeat ankle xray.    New x-ray does confirm lateral malleolus avulsion fracture.  Will continue  wearing the boot using ibuprofen and ice for pain relief.  Will likely need to be immobilized for 6 weeks and may need PT after.  Follow-up in 2 weeks for reevaluation             This document has been electronically signed by Jesus Saucedo DO   May 2, 2024 11:03 EDT    Dictated Utilizing Dragon Dictation: Part of this note may be an electronic transcription/translation of spoken language to printed text using the Dragon Dictation System.

## 2024-05-08 DIAGNOSIS — M79.10 MUSCLE PAIN: ICD-10-CM

## 2024-05-08 DIAGNOSIS — M79.671 ACUTE FOOT PAIN, RIGHT: ICD-10-CM

## 2024-05-08 DIAGNOSIS — M62.838 MUSCLE SPASM: ICD-10-CM

## 2024-05-08 DIAGNOSIS — M25.571 ACUTE RIGHT ANKLE PAIN: ICD-10-CM

## 2024-05-08 DIAGNOSIS — S93.491A SPRAIN OF ANTERIOR TALOFIBULAR LIGAMENT OF RIGHT ANKLE, INITIAL ENCOUNTER: ICD-10-CM

## 2024-05-08 DIAGNOSIS — M25.471 ANKLE SWELLING, RIGHT: ICD-10-CM

## 2024-05-08 RX ORDER — IBUPROFEN 600 MG/1
600 TABLET ORAL EVERY 8 HOURS
Qty: 90 TABLET | Refills: 1
Start: 2024-05-08 | End: 2024-05-09 | Stop reason: SDUPTHER

## 2024-05-09 DIAGNOSIS — M25.571 ACUTE RIGHT ANKLE PAIN: ICD-10-CM

## 2024-05-09 DIAGNOSIS — S93.491A SPRAIN OF ANTERIOR TALOFIBULAR LIGAMENT OF RIGHT ANKLE, INITIAL ENCOUNTER: ICD-10-CM

## 2024-05-09 DIAGNOSIS — M79.10 MUSCLE PAIN: ICD-10-CM

## 2024-05-09 DIAGNOSIS — M79.671 ACUTE FOOT PAIN, RIGHT: ICD-10-CM

## 2024-05-09 DIAGNOSIS — M25.471 ANKLE SWELLING, RIGHT: ICD-10-CM

## 2024-05-09 DIAGNOSIS — M62.838 MUSCLE SPASM: ICD-10-CM

## 2024-05-09 RX ORDER — IBUPROFEN 600 MG/1
600 TABLET ORAL EVERY 8 HOURS
Start: 2024-05-09 | End: 2024-05-09

## 2024-05-09 RX ORDER — IBUPROFEN 600 MG/1
600 TABLET ORAL EVERY 8 HOURS
Qty: 90 TABLET | Refills: 1 | Status: SHIPPED | OUTPATIENT
Start: 2024-05-09

## 2024-05-16 ENCOUNTER — OFFICE VISIT (OUTPATIENT)
Dept: ORTHOPEDIC SURGERY | Facility: CLINIC | Age: 49
End: 2024-05-16
Payer: COMMERCIAL

## 2024-05-16 VITALS
HEART RATE: 67 BPM | DIASTOLIC BLOOD PRESSURE: 81 MMHG | WEIGHT: 204.3 LBS | OXYGEN SATURATION: 98 % | HEIGHT: 65 IN | BODY MASS INDEX: 34.04 KG/M2 | SYSTOLIC BLOOD PRESSURE: 118 MMHG

## 2024-05-16 DIAGNOSIS — S93.491A SPRAIN OF ANTERIOR TALOFIBULAR LIGAMENT OF RIGHT ANKLE, INITIAL ENCOUNTER: ICD-10-CM

## 2024-05-16 DIAGNOSIS — S82.831D CLOSED AVULSION FRACTURE OF DISTAL END OF RIGHT FIBULA WITH ROUTINE HEALING, SUBSEQUENT ENCOUNTER: Primary | ICD-10-CM

## 2024-05-16 DIAGNOSIS — M79.671 ACUTE FOOT PAIN, RIGHT: ICD-10-CM

## 2024-05-16 DIAGNOSIS — M79.10 MUSCLE PAIN: ICD-10-CM

## 2024-05-16 DIAGNOSIS — M62.838 MUSCLE SPASM: ICD-10-CM

## 2024-05-16 DIAGNOSIS — M25.571 ACUTE RIGHT ANKLE PAIN: ICD-10-CM

## 2024-05-16 DIAGNOSIS — M25.471 ANKLE SWELLING, RIGHT: ICD-10-CM

## 2024-05-16 NOTE — PROGRESS NOTES
"Follow Up Visit      Patient: Rosie Cruz  YOB: 1975  Date of Encounter: 05/16/2024  PCP: Thom Rea MD  Referring Provider: No ref. provider found     Subjective   Rosie Cruz is a 49 y.o. female who presents to the office today for evaluation of Follow-up, Edema, and Pain of the Right Ankle      Chief Complaint   Patient presents with    Right Ankle - Follow-up, Edema, Pain       HPI  Patient presents for follow-up for right ankle sprain and distal fibula avulsion fracture.  Patient is feeling somewhat better but still very swollen and tender at the ankle.  Ambulates well in her walking boot  Patient Active Problem List   Diagnosis    Right foot pain    Obesity (BMI 30.0-34.9)       Past Medical History:   Diagnosis Date    Abnormal ECG 2015    Bystolic    Arrhythmia 2015    Fibroid 07/2011    removed via hysterectomy    Fracture of ankle 4/25/24    GERD (gastroesophageal reflux disease)     Heart murmur Birth    High cholesterol     History of Holter monitoring     48 HOUR SEVERAL YEARS AGO    Migraine 01/2021    tried medication    PMS (premenstrual syndrome) 11/1986    [facility] - Hysterectomy cured    PONV (postoperative nausea and vomiting) 2003    Tennis elbow Jan 2018    Varicella 03/1982       Allergies   Allergen Reactions    Penicillins Other (See Comments)     CHILDHOOD       Vitals:   /81 (BP Location: Left arm, Patient Position: Sitting, Cuff Size: Adult)   Pulse 67   Ht 165.1 cm (65\")   Wt 92.7 kg (204 lb 4.8 oz)   SpO2 98%   BMI 34.00 kg/m²               Visit Vitals  /81 (BP Location: Left arm, Patient Position: Sitting, Cuff Size: Adult)   Pulse 67   Ht 165.1 cm (65\")   Wt 92.7 kg (204 lb 4.8 oz)   SpO2 98%   BMI 34.00 kg/m²     49 y.o.female  Physical Exam  Vitals and nursing note reviewed.   Constitutional:       General: She is not in acute distress.     Appearance: Normal appearance.   Pulmonary:      Effort: Pulmonary effort is normal. No respiratory " distress.   Musculoskeletal:      Right ankle: Swelling present. Tenderness present over the lateral malleolus (tip) and ATF ligament. No CF ligament, base of 5th metatarsal or proximal fibula tenderness. Decreased range of motion. Anterior drawer test negative. Normal pulse.      Right Achilles Tendon: No tenderness or defects. Aguila's test negative.        Legs:         Feet:       Comments: Swelling improved, ecchymosis resolved.   Mildly decreased ROM and intact strength. Tender ankle mortise,     Negative syndesmosis squeeze test.  Negative talar tilt test  Mild laxity of ATFL with endpoint and pain on testing.   Skin:     General: Skin is warm and dry.      Findings: No erythema.   Neurological:      General: No focal deficit present.      Mental Status: She is alert.      Sensory: No sensory deficit.      Motor: No weakness.         Radiology Results:    XR Ankle 3+ View Right    Result Date: 5/2/2024  Suspected avulsion fracture of the distal fibula.      This report was finalized on 5/2/2024 11:43 AM by Estrella Calloway M.D..      XR Foot 3+ View Right    Result Date: 4/26/2024    No acute findings in the right foot.   This report was finalized on 4/26/2024 7:48 AM by Dr. Bam Small MD.      XR Ankle 3+ View Right    Result Date: 4/25/2024    No acute fracture or dislocation.   This report was finalized on 4/25/2024 12:55 PM by Dr. Bam Small MD.       Assessment & Plan   Diagnoses and all orders for this visit:    1. Closed avulsion fracture of distal end of right fibula with routine healing, subsequent encounter (Primary)    2. Acute right ankle pain    3. Acute foot pain, right    4. Ankle swelling, right    5. Sprain of anterior talofibular ligament of right ankle, initial encounter    6. Muscle pain    7. Muscle spasm         MEDS ORDERED DURING VISIT:  No orders of the defined types were placed in this encounter.    MEDICATION ISSUES:  Discussed medication options and treatment plan of  prescribed medication as well as the risks, benefits, and side effects including potential falls, possible impaired driving and metabolic adversities among others. Patient is agreeable to call the office with any worsening of symptoms or onset of side effects. Patient is agreeable to call 911 or go to the nearest ER should he/she begin having SI/HI.     Discussion:  Patient pain is mildly improved.  Will continue to treat ankle fracture with immobilization for now and icing and NSAIDs and Tylenol for pain relief.  Patient will follow-up in 2 to 3 weeks for reevaluation             This document has been electronically signed by Jesus Saucedo DO   May 23, 2024 11:29 EDT    Dictated Utilizing Dragon Dictation: Part of this note may be an electronic transcription/translation of spoken language to printed text using the Dragon Dictation System.

## 2024-05-29 ENCOUNTER — HOSPITAL ENCOUNTER (OUTPATIENT)
Dept: GENERAL RADIOLOGY | Facility: HOSPITAL | Age: 49
Discharge: HOME | End: 2024-05-29
Admitting: FAMILY MEDICINE
Payer: COMMERCIAL

## 2024-05-29 PROCEDURE — 73610 X-RAY EXAM OF ANKLE: CPT

## 2024-05-30 ENCOUNTER — OFFICE VISIT (OUTPATIENT)
Dept: ORTHOPEDIC SURGERY | Facility: CLINIC | Age: 49
End: 2024-05-30
Payer: COMMERCIAL

## 2024-05-30 VITALS
HEART RATE: 63 BPM | SYSTOLIC BLOOD PRESSURE: 126 MMHG | DIASTOLIC BLOOD PRESSURE: 73 MMHG | OXYGEN SATURATION: 99 % | WEIGHT: 204.4 LBS | HEIGHT: 65 IN | BODY MASS INDEX: 34.05 KG/M2

## 2024-05-30 DIAGNOSIS — S93.491A SPRAIN OF ANTERIOR TALOFIBULAR LIGAMENT OF RIGHT ANKLE, INITIAL ENCOUNTER: ICD-10-CM

## 2024-05-30 DIAGNOSIS — S82.831D CLOSED AVULSION FRACTURE OF DISTAL END OF RIGHT FIBULA WITH ROUTINE HEALING, SUBSEQUENT ENCOUNTER: ICD-10-CM

## 2024-05-30 DIAGNOSIS — M25.571 ACUTE RIGHT ANKLE PAIN: Primary | ICD-10-CM

## 2024-05-30 PROCEDURE — 99213 OFFICE O/P EST LOW 20 MIN: CPT | Performed by: FAMILY MEDICINE

## 2024-05-30 NOTE — PROGRESS NOTES
"Follow Up Visit      Patient: Rosie Cruz  YOB: 1975  Date of Encounter: 05/30/2024  PCP: Thom Rea MD  Referring Provider: No ref. provider found     Subjective   Rosie Cruz is a 49 y.o. female who presents to the office today for evaluation of Follow-up, Edema, and Pain of the Right Ankle      Chief Complaint   Patient presents with    Right Ankle - Follow-up, Edema, Pain       HPI  Patient presents for follow-up for right ankle sprain and avulsion fracture.  Notes that the pain is starting to get a lot better but has not resolved.  Continues wearing a boot.  Patient Active Problem List   Diagnosis    Right foot pain    Obesity (BMI 30.0-34.9)       Past Medical History:   Diagnosis Date    Abnormal ECG 2015    Bystolic    Arrhythmia 2015    Fibroid 07/2011    removed via hysterectomy    Fracture of ankle 4/25/24    GERD (gastroesophageal reflux disease)     Heart murmur Birth    High cholesterol     History of Holter monitoring     48 HOUR SEVERAL YEARS AGO    Migraine 01/2021    tried medication    PMS (premenstrual syndrome) 11/1986    [facility] - Hysterectomy cured    PONV (postoperative nausea and vomiting) 2003    Tennis elbow Jan 2018    Varicella 03/1982       Allergies   Allergen Reactions    Penicillins Other (See Comments)     CHILDHOOD       Vitals:   /73 (BP Location: Left arm, Patient Position: Sitting, Cuff Size: Adult)   Pulse 63   Ht 165.1 cm (65\")   Wt 92.7 kg (204 lb 6.4 oz)   SpO2 99%   BMI 34.01 kg/m²               Visit Vitals  /73 (BP Location: Left arm, Patient Position: Sitting, Cuff Size: Adult)   Pulse 63   Ht 165.1 cm (65\")   Wt 92.7 kg (204 lb 6.4 oz)   SpO2 99%   BMI 34.01 kg/m²     49 y.o.female  Physical Exam  Vitals and nursing note reviewed.   Constitutional:       General: She is not in acute distress.     Appearance: Normal appearance.   Pulmonary:      Effort: Pulmonary effort is normal. No respiratory distress.   Musculoskeletal:      " Right ankle: No swelling. Tenderness present over the lateral malleolus (anterior tip) and ATF ligament. No CF ligament, base of 5th metatarsal or proximal fibula tenderness. Decreased range of motion. Anterior drawer test negative. Normal pulse.      Right Achilles Tendon: No tenderness or defects. Aguila's test negative.        Legs:         Feet:       Comments: Swelling and ecchymosis resolved.   Mildly decreased ROM and intact strength.     Negative talar tilt and anterior drawer. No laxity or pain.   Skin:     General: Skin is warm and dry.      Findings: No erythema.   Neurological:      General: No focal deficit present.      Mental Status: She is alert.      Sensory: No sensory deficit.      Motor: No weakness.         Radiology Results:    XR Ankle 3+ View Right    Result Date: 5/30/2024    Suspected avulsion fragment fracture of the distal fibula again noted.   This report was finalized on 5/30/2024 8:55 AM by Dr. Loy Chapman MD.      XR Ankle 3+ View Right    Result Date: 5/2/2024  Suspected avulsion fracture of the distal fibula.      This report was finalized on 5/2/2024 11:43 AM by Estrella Calloway M.D..       Assessment & Plan   Diagnoses and all orders for this visit:    1. Acute right ankle pain (Primary)  -     XR Ankle 3+ View Right    2. Sprain of anterior talofibular ligament of right ankle, initial encounter  -     XR Ankle 3+ View Right    3. Closed avulsion fracture of distal end of right fibula with routine healing, subsequent encounter  -     XR Ankle 3+ View Right         MEDS ORDERED DURING VISIT:  No orders of the defined types were placed in this encounter.    MEDICATION ISSUES:  Discussed medication options and treatment plan of prescribed medication as well as the risks, benefits, and side effects including potential falls, possible impaired driving and metabolic adversities among others. Patient is agreeable to call the office with any worsening of symptoms or onset of side  effects. Patient is agreeable to call 911 or go to the nearest ER should he/she begin having SI/HI.     Discussion:  No signs of healing on x-ray but may not be able to see them due to the tiny nature of the fragment.  Point tenderness is much improved but not resolved.  Continue wearing boot for 2 more weeks and will reevaluate.  Patient notices she has not been wearing the boot to sleep in and does not want to.  I recommended she should wear the boot but if she is not going to it would be better for her to at least wear a lace up ankle brace.  She was fitted with a brace today in the office.  Follow-up in 2 weeks             This document has been electronically signed by Jesus Saucedo DO   May 30, 2024 15:48 EDT    Dictated Utilizing Dragon Dictation: Part of this note may be an electronic transcription/translation of spoken language to printed text using the Dragon Dictation System.

## 2024-06-12 ENCOUNTER — OFFICE VISIT (OUTPATIENT)
Dept: CARDIOLOGY | Facility: CLINIC | Age: 49
End: 2024-06-12
Payer: COMMERCIAL

## 2024-06-12 VITALS
BODY MASS INDEX: 35.16 KG/M2 | HEIGHT: 65 IN | DIASTOLIC BLOOD PRESSURE: 78 MMHG | WEIGHT: 211 LBS | SYSTOLIC BLOOD PRESSURE: 122 MMHG | OXYGEN SATURATION: 97 % | HEART RATE: 90 BPM

## 2024-06-12 DIAGNOSIS — E78.2 MIXED HYPERLIPIDEMIA: Primary | ICD-10-CM

## 2024-06-12 PROCEDURE — 99213 OFFICE O/P EST LOW 20 MIN: CPT | Performed by: INTERNAL MEDICINE

## 2024-06-12 NOTE — PROGRESS NOTES
[facility] Cardiology  Office visit  Rosie Cruz  1975  [phone] (work)    VISIT DATE:  6/12/2024    PCP: Thom Rea MD  [address]  [facility] [address]    CC:  Chief Complaint   Patient presents with    Palpitations     6-Wk F/U       Previous cardiac studies and procedures:  March/April 2024  Exercise treadmill test    Patient exercised for 6 minutes achieving 7 METS and 89% of maximal age-predicted heart rate.    No ECG evidence of myocardial ischemia.    Negative clinical evidence of myocardial ischemia.    Findings consistent with a normal ECG stress test.  2-week monitor: Normal, symptoms correlate with sinus rhythm.    ASSESSMENT:   Diagnosis Plan   1. Mixed hyperlipidemia  Lipoprotein A (LPA)    CT Cardiac Calcium Score Without Dye          PLAN:  Palpitations: Low risk cardiac evaluation.  Offered reassurance.  Continue low-dose beta-blockade.  Encouraged regular aerobic exercise.    Hyperlipidemia: Goal LDL less than 100.  Strong family history of coronary disease.  LP(a) and coronary calcium score pending for further cardiac risk stratification.    Subjective  Interval assessment: Denies chest discomfort.  Blood pressures running less than 130/80 mmHg.  Compliant with medical therapy.    Initial evaluation: 49-year-old female with a history of palpitations and dyslipidemia and family history of early coronary disease in her father.  Presents with episodes of nocturnal palpitations.  Describes brief racing heartbeat sensations when she first lies down in bed and intermittent flip-flopping sensations.  Has been more prominent over the past 4 to 6 weeks.  She has had at least 2 episodes of left lower jaw discomfort radiating to her left shoulder.  1 of these episodes occurred at rest while she was at work.  No obvious triggers.  No alleviating or exacerbating features.  Another episode was induced with exertion after carrying  "moderate weight up 3 flights of stairs.  Relieved with rest.  Symptoms can last up to 30 minutes.  Blood pressures generally less than 130/80 mmHg.  She had been started on bisoprolol several years ago for benign palpitations.  Compliant with Livalo, other statin alternatives induced myalgias.    PHYSICAL EXAMINATION:  Vitals:    06/12/24 1535   BP: 122/78   BP Location: Left arm   Patient Position: Sitting   Cuff Size: Adult   Pulse: 90   SpO2: 97%   Weight: 95.7 kg (211 lb)   Height: 165.1 cm (65\")     General Appearance:    Alert, cooperative, no distress, appears stated age   Head:    Normocephalic, without obvious abnormality, atraumatic   Eyes:    conjunctiva/corneas clear   Nose:   Nares normal, septum midline, mucosa normal, no drainage   Throat:   Lips, teeth and gums normal   Neck:   Supple, symmetrical, trachea midline, no carotid    bruit or JVD   Lungs:     Clear to auscultation bilaterally, respirations unlabored   Chest Wall:    No tenderness or deformity    Heart:    Regular rate and rhythm, S1 and S2 normal, no murmur, rub   or gallop, normal carotid impulse bilaterally without bruit.   Abdomen:     Soft, non-tender   Extremities:   Extremities normal, atraumatic, no cyanosis or edema   Pulses:   2+ and symmetric all extremities   Skin:   Skin color, texture, turgor normal, no rashes or lesions       Diagnostic Data:  Procedures  Lab Results   Component Value Date    CHLPL 183 09/12/2014    TRIG 132 11/15/2023    HDL 56 11/15/2023     Lab Results   Component Value Date    GLUCOSE 85 11/15/2023    BUN 12 11/15/2023    CREATININE 0.69 11/15/2023     11/15/2023    K 4.3 11/15/2023     11/15/2023    CO2 26.6 11/15/2023     No results found for: \"HGBA1C\"  Lab Results   Component Value Date    WBC 6.79 11/15/2023    HGB 13.2 11/15/2023    HCT 39.3 11/15/2023     11/15/2023       Allergies  Allergies   Allergen Reactions    Penicillins Other (See Comments)     CHILDHOOD       Current " Medications    Current Outpatient Medications:     Coenzyme Q10 (COQ-10 PO), Take  by mouth Every Night. GUMMIE, Disp: , Rfl:     Diclofenac Sodium (VOLTAREN) 1 % gel gel, Apply 4 grams topically to the appropriate area as directed 4 (Four) Times a Day As Needed (right foot and ankle)., Disp: 150 g, Rfl: 2    ibuprofen (ADVIL,MOTRIN) 600 MG tablet, Take 1 tablet by mouth Every 8 (Eight) Hours. Patient has med at home already. Should take scheduled every 8 hours with food until follow up in 2 weeks., Disp: 90 tablet, Rfl: 1    multivitamin with minerals (Hair Skin and Nails Formula) tablet tablet, Take 1 tablet by mouth Daily. GUMMIE AT NIGHT, Disp: , Rfl:     nebivolol (BYSTOLIC) 5 MG tablet, Take 1 tablet by mouth Daily., Disp: 90 tablet, Rfl: 3    pitavastatin calcium (Livalo) 2 MG tablet tablet, Take 1 tablet (2 mg) by mouth once daily (Patient taking differently: Take  by mouth Every Other Day.), Disp: 30 tablet, Rfl: 11    vitamin D (ERGOCALCIFEROL) 1.25 MG (52981 UT) capsule capsule, Take 1 capsule by mouth weekly, Disp: 12 capsule, Rfl: 3          ROS  ROS      SOCIAL HX  Social History     Socioeconomic History    Marital status:    Tobacco Use    Smoking status: Never     Passive exposure: Past (Both parents smoked growing up)    Smokeless tobacco: Never   Vaping Use    Vaping status: Never Used   Substance and Sexual Activity    Alcohol use: Yes     Alcohol/week: 1.0 standard drink of alcohol     Types: 1 Standard drinks or equivalent per week     Comment: 1-2/week    Drug use: No    Sexual activity: Yes     Partners: Male     Birth control/protection: Hysterectomy     Comment: Partial       FAMILY HX  Family History   Problem Relation Age of Onset    Cancer Mother     Osteoarthritis Mother     Hypertension Mother     Deep vein thrombosis Mother     Pulmonary embolism Mother     Stroke Mother     Osteoporosis Mother     Asthma Mother     Broken bones Mother     Cancer Father     Hypertension Father      Colon cancer Father     Heart attack Father     Hyperlipidemia Father     Gout Maternal Grandfather     Cancer Maternal Grandfather     Diabetes Maternal Grandfather     Hypertension Maternal Grandfather     Heart attack Maternal Grandfather     Hyperlipidemia Maternal Grandfather     Diabetes Paternal Grandfather     Breast cancer Neg Hx              Tommy Alex III, MD, FACC

## 2024-06-13 ENCOUNTER — OFFICE VISIT (OUTPATIENT)
Dept: ORTHOPEDIC SURGERY | Facility: CLINIC | Age: 49
End: 2024-06-13
Payer: COMMERCIAL

## 2024-06-13 ENCOUNTER — HOSPITAL ENCOUNTER (OUTPATIENT)
Dept: GENERAL RADIOLOGY | Facility: HOSPITAL | Age: 49
Discharge: HOME | End: 2024-06-13
Admitting: FAMILY MEDICINE
Payer: COMMERCIAL

## 2024-06-13 VITALS
HEART RATE: 72 BPM | SYSTOLIC BLOOD PRESSURE: 133 MMHG | WEIGHT: 211 LBS | BODY MASS INDEX: 35.16 KG/M2 | OXYGEN SATURATION: 98 % | HEIGHT: 65 IN | DIASTOLIC BLOOD PRESSURE: 86 MMHG

## 2024-06-13 DIAGNOSIS — M25.571 ACUTE RIGHT ANKLE PAIN: Primary | ICD-10-CM

## 2024-06-13 DIAGNOSIS — M62.838 MUSCLE SPASM: ICD-10-CM

## 2024-06-13 DIAGNOSIS — M79.10 MUSCLE PAIN: ICD-10-CM

## 2024-06-13 DIAGNOSIS — M79.671 ACUTE FOOT PAIN, RIGHT: ICD-10-CM

## 2024-06-13 DIAGNOSIS — S93.491A SPRAIN OF ANTERIOR TALOFIBULAR LIGAMENT OF RIGHT ANKLE, INITIAL ENCOUNTER: ICD-10-CM

## 2024-06-13 DIAGNOSIS — M25.471 ANKLE SWELLING, RIGHT: ICD-10-CM

## 2024-06-13 DIAGNOSIS — S82.831D CLOSED AVULSION FRACTURE OF DISTAL END OF RIGHT FIBULA WITH ROUTINE HEALING, SUBSEQUENT ENCOUNTER: ICD-10-CM

## 2024-06-13 PROCEDURE — 73610 X-RAY EXAM OF ANKLE: CPT

## 2024-06-20 NOTE — PROGRESS NOTES
"Follow Up Visit      Patient: Rosie Cruz  YOB: 1975  Date of Encounter: 06/13/2024  PCP: Thom Rea MD  Referring Provider: No ref. provider found     Subjective   Rosie Cruz is a 49 y.o. female who presents to the office today for evaluation of Follow-up, Edema, and Pain of the Right Ankle      Chief Complaint   Patient presents with    Right Ankle - Follow-up, Edema, Pain       HPI  Patient presents for follow-up for right ankle sprain and avulsion fracture of the distal fibula.  Patient notes that she is feeling much better.  She has been ambulating in the boot and notes 90% improvement in pain overall.  Still will get random sharp pains and cramping.  Has been sleeping in the lace up brace and had a skin reaction to it but does not get it if she wears socks underneath  Patient Active Problem List   Diagnosis    Right foot pain    Obesity (BMI 30.0-34.9)       Past Medical History:   Diagnosis Date    Abnormal ECG 2015    Bystolic    Arrhythmia 2015    Fibroid 07/2011    removed via hysterectomy    Fracture of ankle 4/25/24    GERD (gastroesophageal reflux disease)     Heart murmur Birth    High cholesterol     History of Holter monitoring     48 HOUR SEVERAL YEARS AGO    Migraine 01/2021    tried medication    PMS (premenstrual syndrome) 11/1986    [facility] - Hysterectomy cured    PONV (postoperative nausea and vomiting) 2003    Tennis elbow Jan 2018    Varicella 03/1982       Allergies   Allergen Reactions    Penicillins Other (See Comments)     CHILDHOOD       Vitals:   /86 (BP Location: Left arm, Patient Position: Sitting, Cuff Size: Adult)   Pulse 72   Ht 165.1 cm (65\")   Wt 95.7 kg (211 lb)   SpO2 98%   BMI 35.11 kg/m²               Visit Vitals  /86 (BP Location: Left arm, Patient Position: Sitting, Cuff Size: Adult)   Pulse 72   Ht 165.1 cm (65\")   Wt 95.7 kg (211 lb)   SpO2 98%   BMI 35.11 kg/m²     49 y.o.female  Physical Exam  Vitals and nursing note " Nephrology reviewed.   Constitutional:       General: She is not in acute distress.     Appearance: Normal appearance.   Pulmonary:      Effort: Pulmonary effort is normal. No respiratory distress.   Musculoskeletal:      Right ankle: No swelling. No tenderness. No CF ligament, base of 5th metatarsal or proximal fibula tenderness. Decreased range of motion. Anterior drawer test negative. Normal pulse.      Right Achilles Tendon: No tenderness or defects. Aguila's test negative.      Comments: Swelling and ecchymosis resolved.   Mildly decreased ROM and intact strength.     Negative talar tilt and anterior drawer. No laxity or pain.   Skin:     General: Skin is warm and dry.      Findings: No erythema.   Neurological:      General: No focal deficit present.      Mental Status: She is alert.      Sensory: No sensory deficit.      Motor: No weakness.         Radiology Results:    XR Ankle 3+ View Right    Result Date: 6/14/2024  The previously noted small avulsion fracture is not visualized on today's exam.      This report was finalized on 6/14/2024 8:18 AM by Estrella Calloway M.D..      XR Ankle 3+ View Right    Result Date: 5/30/2024    Suspected avulsion fragment fracture of the distal fibula again noted.   This report was finalized on 5/30/2024 8:55 AM by Dr. Loy Chapman MD.       Assessment & Plan   Diagnoses and all orders for this visit:    1. Acute right ankle pain (Primary)  -     Ambulatory Referral to Physical Therapy for Evaluation & Treatment  -     XR Ankle 3+ View Right    2. Sprain of anterior talofibular ligament of right ankle, initial encounter  -     Ambulatory Referral to Physical Therapy for Evaluation & Treatment  -     XR Ankle 3+ View Right    3. Closed avulsion fracture of distal end of right fibula with routine healing, subsequent encounter  -     Ambulatory Referral to Physical Therapy for Evaluation & Treatment  -     XR Ankle 3+ View Right    4. Acute foot pain, right  -     Ambulatory  Referral to Physical Therapy for Evaluation & Treatment  -     XR Ankle 3+ View Right    5. Ankle swelling, right  -     Ambulatory Referral to Physical Therapy for Evaluation & Treatment  -     XR Ankle 3+ View Right    6. Muscle pain  -     Ambulatory Referral to Physical Therapy for Evaluation & Treatment  -     XR Ankle 3+ View Right    7. Muscle spasm  -     Ambulatory Referral to Physical Therapy for Evaluation & Treatment  -     XR Ankle 3+ View Right         MEDS ORDERED DURING VISIT:  No orders of the defined types were placed in this encounter.    MEDICATION ISSUES:  Discussed medication options and treatment plan of prescribed medication as well as the risks, benefits, and side effects including potential falls, possible impaired driving and metabolic adversities among others. Patient is agreeable to call the office with any worsening of symptoms or onset of side effects. Patient is agreeable to call 911 or go to the nearest ER should he/she begin having SI/HI.     Discussion:  Patient is doing much better with resolution of pain on exam in the avulsion chip on x-ray.  Patient will transition into the lace up ankle brace and is referred to PT for strengthening and stabilization.  Given home exercise program for the ankle.  Follow-up in 1 month or sooner if needed             This document has been electronically signed by Jesus Saucedo DO   June 20, 2024 14:36 EDT    Dictated Utilizing Dragon Dictation: Part of this note may be an electronic transcription/translation of spoken language to printed text using the Dragon Dictation System.

## 2024-06-28 ENCOUNTER — LAB (OUTPATIENT)
Dept: LAB | Facility: HOSPITAL | Age: 49
End: 2024-06-28
Payer: COMMERCIAL

## 2024-06-28 ENCOUNTER — TRANSCRIBE ORDERS (OUTPATIENT)
Dept: LAB | Facility: HOSPITAL | Age: 49
End: 2024-06-28
Payer: COMMERCIAL

## 2024-06-28 DIAGNOSIS — E78.2 MIXED HYPERLIPIDEMIA: ICD-10-CM

## 2024-06-28 DIAGNOSIS — E78.5 HYPERLIPIDEMIA, UNSPECIFIED HYPERLIPIDEMIA TYPE: ICD-10-CM

## 2024-06-28 DIAGNOSIS — E78.5 HYPERLIPIDEMIA, UNSPECIFIED HYPERLIPIDEMIA TYPE: Primary | ICD-10-CM

## 2024-06-28 PROCEDURE — 83695 ASSAY OF LIPOPROTEIN(A): CPT

## 2024-06-28 PROCEDURE — 80053 COMPREHEN METABOLIC PANEL: CPT

## 2024-06-28 PROCEDURE — 85025 COMPLETE CBC W/AUTO DIFF WBC: CPT

## 2024-06-28 PROCEDURE — 80061 LIPID PANEL: CPT

## 2024-06-28 PROCEDURE — 36415 COLL VENOUS BLD VENIPUNCTURE: CPT

## 2024-06-29 LAB
ALBUMIN SERPL-MCNC: 4.8 G/DL (ref 3.5–5.2)
ALBUMIN/GLOB SERPL: 1.8 G/DL
ALP SERPL-CCNC: 85 U/L (ref 39–117)
ALT SERPL W P-5'-P-CCNC: 22 U/L (ref 1–33)
ANION GAP SERPL CALCULATED.3IONS-SCNC: 14.1 MMOL/L (ref 5–15)
AST SERPL-CCNC: 22 U/L (ref 1–32)
BASOPHILS # BLD AUTO: 0.06 10*3/MM3 (ref 0–0.2)
BASOPHILS NFR BLD AUTO: 1 % (ref 0–1.5)
BILIRUB SERPL-MCNC: 0.4 MG/DL (ref 0–1.2)
BUN SERPL-MCNC: 13 MG/DL (ref 6–20)
BUN/CREAT SERPL: 16.3 (ref 7–25)
CALCIUM SPEC-SCNC: 10.1 MG/DL (ref 8.6–10.5)
CHLORIDE SERPL-SCNC: 104 MMOL/L (ref 98–107)
CHOLEST SERPL-MCNC: 212 MG/DL (ref 0–200)
CO2 SERPL-SCNC: 23.9 MMOL/L (ref 22–29)
CREAT SERPL-MCNC: 0.8 MG/DL (ref 0.57–1)
DEPRECATED RDW RBC AUTO: 45.2 FL (ref 37–54)
EGFRCR SERPLBLD CKD-EPI 2021: 90.5 ML/MIN/1.73
EOSINOPHIL # BLD AUTO: 0.16 10*3/MM3 (ref 0–0.4)
EOSINOPHIL NFR BLD AUTO: 2.6 % (ref 0.3–6.2)
ERYTHROCYTE [DISTWIDTH] IN BLOOD BY AUTOMATED COUNT: 13 % (ref 12.3–15.4)
GLOBULIN UR ELPH-MCNC: 2.6 GM/DL
GLUCOSE SERPL-MCNC: 94 MG/DL (ref 65–99)
HCT VFR BLD AUTO: 42.9 % (ref 34–46.6)
HDLC SERPL-MCNC: 62 MG/DL (ref 40–60)
HGB BLD-MCNC: 14.4 G/DL (ref 12–15.9)
IMM GRANULOCYTES # BLD AUTO: 0.02 10*3/MM3 (ref 0–0.05)
IMM GRANULOCYTES NFR BLD AUTO: 0.3 % (ref 0–0.5)
LDLC SERPL CALC-MCNC: 126 MG/DL (ref 0–100)
LDLC/HDLC SERPL: 1.98 {RATIO}
LYMPHOCYTES # BLD AUTO: 2.59 10*3/MM3 (ref 0.7–3.1)
LYMPHOCYTES NFR BLD AUTO: 42.3 % (ref 19.6–45.3)
MCH RBC QN AUTO: 31.9 PG (ref 26.6–33)
MCHC RBC AUTO-ENTMCNC: 33.6 G/DL (ref 31.5–35.7)
MCV RBC AUTO: 94.9 FL (ref 79–97)
MONOCYTES # BLD AUTO: 0.37 10*3/MM3 (ref 0.1–0.9)
MONOCYTES NFR BLD AUTO: 6 % (ref 5–12)
NEUTROPHILS NFR BLD AUTO: 2.92 10*3/MM3 (ref 1.7–7)
NEUTROPHILS NFR BLD AUTO: 47.8 % (ref 42.7–76)
NRBC BLD AUTO-RTO: 0 /100 WBC (ref 0–0.2)
PLATELET # BLD AUTO: 190 10*3/MM3 (ref 140–450)
PMV BLD AUTO: 11.8 FL (ref 6–12)
POTASSIUM SERPL-SCNC: 4.2 MMOL/L (ref 3.5–5.2)
PROT SERPL-MCNC: 7.4 G/DL (ref 6–8.5)
RBC # BLD AUTO: 4.52 10*6/MM3 (ref 3.77–5.28)
SODIUM SERPL-SCNC: 142 MMOL/L (ref 136–145)
TRIGL SERPL-MCNC: 135 MG/DL (ref 0–150)
VLDLC SERPL-MCNC: 24 MG/DL (ref 5–40)
WBC NRBC COR # BLD AUTO: 6.12 10*3/MM3 (ref 3.4–10.8)

## 2024-07-01 LAB — LPA SERPL-SCNC: <8.4 NMOL/L

## 2024-07-24 ENCOUNTER — OFFICE VISIT (OUTPATIENT)
Dept: ORTHOPEDIC SURGERY | Facility: CLINIC | Age: 49
End: 2024-07-24
Payer: COMMERCIAL

## 2024-07-24 VITALS
HEIGHT: 65 IN | DIASTOLIC BLOOD PRESSURE: 70 MMHG | HEART RATE: 72 BPM | SYSTOLIC BLOOD PRESSURE: 132 MMHG | WEIGHT: 211 LBS | BODY MASS INDEX: 35.16 KG/M2 | OXYGEN SATURATION: 98 %

## 2024-07-24 DIAGNOSIS — S82.831D CLOSED AVULSION FRACTURE OF DISTAL END OF RIGHT FIBULA WITH ROUTINE HEALING, SUBSEQUENT ENCOUNTER: ICD-10-CM

## 2024-07-24 DIAGNOSIS — S93.491A SPRAIN OF ANTERIOR TALOFIBULAR LIGAMENT OF RIGHT ANKLE, INITIAL ENCOUNTER: ICD-10-CM

## 2024-07-24 DIAGNOSIS — M79.671 ACUTE FOOT PAIN, RIGHT: ICD-10-CM

## 2024-07-24 DIAGNOSIS — M25.571 ACUTE RIGHT ANKLE PAIN: Primary | ICD-10-CM

## 2024-07-29 NOTE — PROGRESS NOTES
"Follow Up Visit      Patient: Rosie Cruz  YOB: 1975  Date of Encounter: 07/24/2024  PCP: Thom Rea MD  Referring Provider: No ref. provider found     Subjective   Rosie Cruz is a 49 y.o. female who presents to the office today for evaluation of Follow-up, Edema, and Pain of the Right Ankle      Chief Complaint   Patient presents with    Right Ankle - Follow-up, Edema, Pain       History of Present Illness  Patient presents for follow-up for right distal fibula fracture.  Has been doing very well since PT.  He is ambulating in normal shoes and barefoot without pain.  States she has normal strength and range of motion.  Asymptomatic    Patient Active Problem List   Diagnosis    Right foot pain    Obesity (BMI 30.0-34.9)       Past Medical History:   Diagnosis Date    Abnormal ECG 2015    Bystolic    Arrhythmia 2015    Fibroid 07/2011    removed via hysterectomy    Fracture of ankle 4/25/24    GERD (gastroesophageal reflux disease)     Heart murmur Birth    High cholesterol     History of Holter monitoring     48 HOUR SEVERAL YEARS AGO    Migraine 01/2021    tried medication    PMS (premenstrual syndrome) 11/1986    [facility] - Hysterectomy cured    PONV (postoperative nausea and vomiting) 2003    Tennis elbow Jan 2018    Varicella 03/1982       Allergies   Allergen Reactions    Penicillins Other (See Comments)     CHILDHOOD       Vitals:   /70 (BP Location: Left arm, Patient Position: Sitting, Cuff Size: Adult)   Pulse 72   Ht 165.1 cm (65\")   Wt 95.7 kg (211 lb)   SpO2 98%   BMI 35.11 kg/m²           Visit Vitals  /70 (BP Location: Left arm, Patient Position: Sitting, Cuff Size: Adult)   Pulse 72   Ht 165.1 cm (65\")   Wt 95.7 kg (211 lb)   SpO2 98%   BMI 35.11 kg/m²     49 y.o.female      Physical Exam    Physical Exam  Vitals and nursing note reviewed.   Constitutional:       General: She is not in acute distress.     Appearance: Normal appearance.   Pulmonary:     "  Effort: Pulmonary effort is normal. No respiratory distress.   Musculoskeletal:      Right ankle: No swelling. No tenderness. No CF ligament, base of 5th metatarsal or proximal fibula tenderness. Normal range of motion. Anterior drawer test negative. Normal pulse.      Right Achilles Tendon: No tenderness or defects. Aguila's test negative.      Comments: Negative talar tilt and anterior drawer. No laxity or pain.   Skin:     General: Skin is warm and dry.      Findings: No erythema.   Neurological:      General: No focal deficit present.      Mental Status: She is alert.      Sensory: No sensory deficit.      Motor: No weakness.         Radiology Results:    No radiology results for the last 30 days.    Results      Assessment & Plan     Diagnoses and all orders for this visit:    1. Acute right ankle pain (Primary)    2. Sprain of anterior talofibular ligament of right ankle, initial encounter    3. Closed avulsion fracture of distal end of right fibula with routine healing, subsequent encounter    4. Acute foot pain, right          MEDS ORDERED DURING VISIT:  No orders of the defined types were placed in this encounter.    MEDICATION ISSUES:  Discussed medication options and treatment plan of prescribed medication as well as the risks, benefits, and side effects including potential falls, possible impaired driving and metabolic adversities among others. Patient is agreeable to call the office with any worsening of symptoms or onset of side effects. Patient is agreeable to call 911 or go to the nearest ER should he/she begin having SI/HI.     Discussion:    Ankle exam is WNL.  Patient doing very well.  Will finish out PT as scheduled follow-up as needed    This document has been electronically signed by Jesus Saucedo DO   July 28, 2024 23:40 EDT

## 2024-11-11 ENCOUNTER — TRANSCRIBE ORDERS (OUTPATIENT)
Dept: LAB | Facility: HOSPITAL | Age: 49
End: 2024-11-11
Payer: COMMERCIAL

## 2024-11-11 ENCOUNTER — LAB (OUTPATIENT)
Dept: LAB | Facility: HOSPITAL | Age: 49
End: 2024-11-11
Payer: COMMERCIAL

## 2024-11-11 DIAGNOSIS — R23.2 HOT FLASHES: ICD-10-CM

## 2024-11-11 DIAGNOSIS — R23.2 HOT FLASHES: Primary | ICD-10-CM

## 2024-11-11 PROCEDURE — 83002 ASSAY OF GONADOTROPIN (LH): CPT

## 2024-11-11 PROCEDURE — 84402 ASSAY OF FREE TESTOSTERONE: CPT

## 2024-11-11 PROCEDURE — 36415 COLL VENOUS BLD VENIPUNCTURE: CPT

## 2024-11-11 PROCEDURE — 82306 VITAMIN D 25 HYDROXY: CPT

## 2024-11-11 PROCEDURE — 80050 GENERAL HEALTH PANEL: CPT

## 2024-11-11 PROCEDURE — 83001 ASSAY OF GONADOTROPIN (FSH): CPT

## 2024-11-11 PROCEDURE — 84146 ASSAY OF PROLACTIN: CPT

## 2024-11-11 PROCEDURE — 82672 ASSAY OF ESTROGEN: CPT

## 2024-11-11 PROCEDURE — 82670 ASSAY OF TOTAL ESTRADIOL: CPT

## 2024-11-11 PROCEDURE — 83970 ASSAY OF PARATHORMONE: CPT

## 2024-11-11 PROCEDURE — 82626 DEHYDROEPIANDROSTERONE: CPT

## 2024-11-11 PROCEDURE — 84144 ASSAY OF PROGESTERONE: CPT

## 2024-11-11 PROCEDURE — 84403 ASSAY OF TOTAL TESTOSTERONE: CPT

## 2024-11-12 LAB
25(OH)D3 SERPL-MCNC: 30.3 NG/ML (ref 30–100)
ALBUMIN SERPL-MCNC: 4.5 G/DL (ref 3.5–5.2)
ALBUMIN/GLOB SERPL: 1.7 G/DL
ALP SERPL-CCNC: 92 U/L (ref 39–117)
ALT SERPL W P-5'-P-CCNC: 36 U/L (ref 1–33)
ANION GAP SERPL CALCULATED.3IONS-SCNC: 10.3 MMOL/L (ref 5–15)
AST SERPL-CCNC: 23 U/L (ref 1–32)
BASOPHILS # BLD AUTO: 0.06 10*3/MM3 (ref 0–0.2)
BASOPHILS NFR BLD AUTO: 1 % (ref 0–1.5)
BILIRUB SERPL-MCNC: <0.2 MG/DL (ref 0–1.2)
BUN SERPL-MCNC: 14 MG/DL (ref 6–20)
BUN/CREAT SERPL: 18.4 (ref 7–25)
CALCIUM SPEC-SCNC: 10.2 MG/DL (ref 8.6–10.5)
CHLORIDE SERPL-SCNC: 104 MMOL/L (ref 98–107)
CO2 SERPL-SCNC: 25.7 MMOL/L (ref 22–29)
CREAT SERPL-MCNC: 0.76 MG/DL (ref 0.57–1)
DEPRECATED RDW RBC AUTO: 43.9 FL (ref 37–54)
EGFRCR SERPLBLD CKD-EPI 2021: 96.2 ML/MIN/1.73
EOSINOPHIL # BLD AUTO: 0.15 10*3/MM3 (ref 0–0.4)
EOSINOPHIL NFR BLD AUTO: 2.5 % (ref 0.3–6.2)
ERYTHROCYTE [DISTWIDTH] IN BLOOD BY AUTOMATED COUNT: 12.9 % (ref 12.3–15.4)
ESTRADIOL SERPL HS-MCNC: <5 PG/ML
FSH SERPL-ACNC: 55.5 MIU/ML
GLOBULIN UR ELPH-MCNC: 2.6 GM/DL
GLUCOSE SERPL-MCNC: 118 MG/DL (ref 65–99)
HCT VFR BLD AUTO: 39.2 % (ref 34–46.6)
HGB BLD-MCNC: 13.8 G/DL (ref 12–15.9)
IMM GRANULOCYTES # BLD AUTO: 0.03 10*3/MM3 (ref 0–0.05)
IMM GRANULOCYTES NFR BLD AUTO: 0.5 % (ref 0–0.5)
LH SERPL-ACNC: 33.8 MIU/ML
LYMPHOCYTES # BLD AUTO: 2.16 10*3/MM3 (ref 0.7–3.1)
LYMPHOCYTES NFR BLD AUTO: 36.4 % (ref 19.6–45.3)
MCH RBC QN AUTO: 32.9 PG (ref 26.6–33)
MCHC RBC AUTO-ENTMCNC: 35.2 G/DL (ref 31.5–35.7)
MCV RBC AUTO: 93.3 FL (ref 79–97)
MONOCYTES # BLD AUTO: 0.41 10*3/MM3 (ref 0.1–0.9)
MONOCYTES NFR BLD AUTO: 6.9 % (ref 5–12)
NEUTROPHILS NFR BLD AUTO: 3.13 10*3/MM3 (ref 1.7–7)
NEUTROPHILS NFR BLD AUTO: 52.7 % (ref 42.7–76)
NRBC BLD AUTO-RTO: 0 /100 WBC (ref 0–0.2)
PLATELET # BLD AUTO: 208 10*3/MM3 (ref 140–450)
PMV BLD AUTO: 11.6 FL (ref 6–12)
POTASSIUM SERPL-SCNC: 4.2 MMOL/L (ref 3.5–5.2)
PROGEST SERPL-MCNC: <0.05 NG/ML
PROLACTIN SERPL-MCNC: 10 NG/ML (ref 4.79–23.3)
PROT SERPL-MCNC: 7.1 G/DL (ref 6–8.5)
PTH-INTACT SERPL-MCNC: 33.3 PG/ML (ref 15–65)
RBC # BLD AUTO: 4.2 10*6/MM3 (ref 3.77–5.28)
SODIUM SERPL-SCNC: 140 MMOL/L (ref 136–145)
TSH SERPL DL<=0.05 MIU/L-ACNC: 1.55 UIU/ML (ref 0.27–4.2)
WBC NRBC COR # BLD AUTO: 5.94 10*3/MM3 (ref 3.4–10.8)

## 2024-11-15 LAB — ESTROGEN SERPL-MCNC: 45 PG/ML

## 2024-11-19 LAB
TESTOST FREE SERPL-MCNC: ABNORMAL PG/ML
TESTOST SERPL-MCNC: <3 NG/DL (ref 4–50)

## 2024-11-20 LAB — DHEA SERPL-MCNC: 151 NG/DL (ref 31–701)

## 2025-03-22 ENCOUNTER — LAB (OUTPATIENT)
Dept: LAB | Facility: HOSPITAL | Age: 50
End: 2025-03-22
Payer: COMMERCIAL

## 2025-03-22 ENCOUNTER — TRANSCRIBE ORDERS (OUTPATIENT)
Dept: LAB | Facility: HOSPITAL | Age: 50
End: 2025-03-22
Payer: COMMERCIAL

## 2025-03-22 DIAGNOSIS — E78.2 MIXED HYPERLIPIDEMIA: ICD-10-CM

## 2025-03-22 DIAGNOSIS — E78.2 MIXED HYPERLIPIDEMIA: Primary | ICD-10-CM

## 2025-03-22 LAB
ALBUMIN SERPL-MCNC: 4.5 G/DL (ref 3.5–5.2)
ALBUMIN/GLOB SERPL: 1.6 G/DL
ALP SERPL-CCNC: 99 U/L (ref 39–117)
ALT SERPL W P-5'-P-CCNC: 21 U/L (ref 1–33)
ANION GAP SERPL CALCULATED.3IONS-SCNC: 11 MMOL/L (ref 5–15)
AST SERPL-CCNC: 19 U/L (ref 1–32)
BASOPHILS # BLD AUTO: 0.05 10*3/MM3 (ref 0–0.2)
BASOPHILS NFR BLD AUTO: 0.8 % (ref 0–1.5)
BILIRUB SERPL-MCNC: 0.3 MG/DL (ref 0–1.2)
BUN SERPL-MCNC: 14 MG/DL (ref 6–20)
BUN/CREAT SERPL: 17.7 (ref 7–25)
CALCIUM SPEC-SCNC: 9.7 MG/DL (ref 8.6–10.5)
CHLORIDE SERPL-SCNC: 107 MMOL/L (ref 98–107)
CHOLEST SERPL-MCNC: 178 MG/DL (ref 0–200)
CO2 SERPL-SCNC: 25 MMOL/L (ref 22–29)
CREAT SERPL-MCNC: 0.79 MG/DL (ref 0.57–1)
DEPRECATED RDW RBC AUTO: 46.1 FL (ref 37–54)
EGFRCR SERPLBLD CKD-EPI 2021: 91.3 ML/MIN/1.73
EOSINOPHIL # BLD AUTO: 0.31 10*3/MM3 (ref 0–0.4)
EOSINOPHIL NFR BLD AUTO: 5.2 % (ref 0.3–6.2)
ERYTHROCYTE [DISTWIDTH] IN BLOOD BY AUTOMATED COUNT: 13.1 % (ref 12.3–15.4)
GLOBULIN UR ELPH-MCNC: 2.8 GM/DL
GLUCOSE SERPL-MCNC: 94 MG/DL (ref 65–99)
HCT VFR BLD AUTO: 44 % (ref 34–46.6)
HDLC SERPL-MCNC: 59 MG/DL (ref 40–60)
HGB BLD-MCNC: 14.2 G/DL (ref 12–15.9)
IMM GRANULOCYTES # BLD AUTO: 0.01 10*3/MM3 (ref 0–0.05)
IMM GRANULOCYTES NFR BLD AUTO: 0.2 % (ref 0–0.5)
LDLC SERPL CALC-MCNC: 103 MG/DL (ref 0–100)
LDLC/HDLC SERPL: 1.72 {RATIO}
LYMPHOCYTES # BLD AUTO: 2.3 10*3/MM3 (ref 0.7–3.1)
LYMPHOCYTES NFR BLD AUTO: 38.6 % (ref 19.6–45.3)
MCH RBC QN AUTO: 30.8 PG (ref 26.6–33)
MCHC RBC AUTO-ENTMCNC: 32.3 G/DL (ref 31.5–35.7)
MCV RBC AUTO: 95.4 FL (ref 79–97)
MONOCYTES # BLD AUTO: 0.39 10*3/MM3 (ref 0.1–0.9)
MONOCYTES NFR BLD AUTO: 6.5 % (ref 5–12)
NEUTROPHILS NFR BLD AUTO: 2.9 10*3/MM3 (ref 1.7–7)
NEUTROPHILS NFR BLD AUTO: 48.7 % (ref 42.7–76)
NRBC BLD AUTO-RTO: 0 /100 WBC (ref 0–0.2)
PLATELET # BLD AUTO: 196 10*3/MM3 (ref 140–450)
PMV BLD AUTO: 10.8 FL (ref 6–12)
POTASSIUM SERPL-SCNC: 4.4 MMOL/L (ref 3.5–5.2)
PROT SERPL-MCNC: 7.3 G/DL (ref 6–8.5)
RBC # BLD AUTO: 4.61 10*6/MM3 (ref 3.77–5.28)
SODIUM SERPL-SCNC: 143 MMOL/L (ref 136–145)
TRIGL SERPL-MCNC: 89 MG/DL (ref 0–150)
VLDLC SERPL-MCNC: 16 MG/DL (ref 5–40)
WBC NRBC COR # BLD AUTO: 5.96 10*3/MM3 (ref 3.4–10.8)

## 2025-03-22 PROCEDURE — 36415 COLL VENOUS BLD VENIPUNCTURE: CPT

## 2025-03-22 PROCEDURE — 80061 LIPID PANEL: CPT

## 2025-03-22 PROCEDURE — 85025 COMPLETE CBC W/AUTO DIFF WBC: CPT

## 2025-03-22 PROCEDURE — 80053 COMPREHEN METABOLIC PANEL: CPT

## 2025-03-26 ENCOUNTER — TRANSCRIBE ORDERS (OUTPATIENT)
Dept: ADMINISTRATIVE | Facility: HOSPITAL | Age: 50
End: 2025-03-26
Payer: COMMERCIAL

## 2025-03-26 DIAGNOSIS — Z12.31 SCREENING MAMMOGRAM, ENCOUNTER FOR: Primary | ICD-10-CM

## 2025-06-13 ENCOUNTER — HOSPITAL ENCOUNTER (OUTPATIENT)
Facility: HOSPITAL | Age: 50
Discharge: HOME | End: 2025-06-13
Payer: COMMERCIAL

## 2025-06-13 ENCOUNTER — TRANSCRIBE ORDERS (OUTPATIENT)
Dept: LAB | Facility: HOSPITAL | Age: 50
End: 2025-06-13
Payer: COMMERCIAL

## 2025-06-13 DIAGNOSIS — R23.2 HOT FLASHES: ICD-10-CM

## 2025-06-13 DIAGNOSIS — Z12.31 SCREENING MAMMOGRAM, ENCOUNTER FOR: ICD-10-CM

## 2025-06-13 DIAGNOSIS — R23.2 HOT FLASHES: Primary | ICD-10-CM

## 2025-06-13 LAB
BASOPHILS # BLD AUTO: 0.06 10*3/MM3 (ref 0–0.2)
BASOPHILS NFR BLD AUTO: 1.1 % (ref 0–1.5)
DEPRECATED RDW RBC AUTO: 46.9 FL (ref 37–54)
EOSINOPHIL # BLD AUTO: 0.15 10*3/MM3 (ref 0–0.4)
EOSINOPHIL NFR BLD AUTO: 2.6 % (ref 0.3–6.2)
ERYTHROCYTE [DISTWIDTH] IN BLOOD BY AUTOMATED COUNT: 13.2 % (ref 12.3–15.4)
HCT VFR BLD AUTO: 42.3 % (ref 34–46.6)
HGB BLD-MCNC: 13.9 G/DL (ref 12–15.9)
IMM GRANULOCYTES # BLD AUTO: 0.01 10*3/MM3 (ref 0–0.05)
IMM GRANULOCYTES NFR BLD AUTO: 0.2 % (ref 0–0.5)
LYMPHOCYTES # BLD AUTO: 2.28 10*3/MM3 (ref 0.7–3.1)
LYMPHOCYTES NFR BLD AUTO: 40 % (ref 19.6–45.3)
MCH RBC QN AUTO: 31.4 PG (ref 26.6–33)
MCHC RBC AUTO-ENTMCNC: 32.9 G/DL (ref 31.5–35.7)
MCV RBC AUTO: 95.7 FL (ref 79–97)
MONOCYTES # BLD AUTO: 0.36 10*3/MM3 (ref 0.1–0.9)
MONOCYTES NFR BLD AUTO: 6.3 % (ref 5–12)
NEUTROPHILS NFR BLD AUTO: 2.84 10*3/MM3 (ref 1.7–7)
NEUTROPHILS NFR BLD AUTO: 49.8 % (ref 42.7–76)
NRBC BLD AUTO-RTO: 0 /100 WBC (ref 0–0.2)
PLATELET # BLD AUTO: 205 10*3/MM3 (ref 140–450)
PMV BLD AUTO: 10.8 FL (ref 6–12)
RBC # BLD AUTO: 4.42 10*6/MM3 (ref 3.77–5.28)
WBC NRBC COR # BLD AUTO: 5.7 10*3/MM3 (ref 3.4–10.8)

## 2025-06-13 PROCEDURE — 84439 ASSAY OF FREE THYROXINE: CPT

## 2025-06-13 PROCEDURE — 77067 SCR MAMMO BI INCL CAD: CPT

## 2025-06-13 PROCEDURE — 84146 ASSAY OF PROLACTIN: CPT

## 2025-06-13 PROCEDURE — 82672 ASSAY OF ESTROGEN: CPT

## 2025-06-13 PROCEDURE — 83002 ASSAY OF GONADOTROPIN (LH): CPT

## 2025-06-13 PROCEDURE — 84144 ASSAY OF PROGESTERONE: CPT

## 2025-06-13 PROCEDURE — 80050 GENERAL HEALTH PANEL: CPT

## 2025-06-13 PROCEDURE — 82670 ASSAY OF TOTAL ESTRADIOL: CPT

## 2025-06-13 PROCEDURE — 84402 ASSAY OF FREE TESTOSTERONE: CPT

## 2025-06-13 PROCEDURE — 82306 VITAMIN D 25 HYDROXY: CPT

## 2025-06-13 PROCEDURE — 82627 DEHYDROEPIANDROSTERONE: CPT

## 2025-06-13 PROCEDURE — 84403 ASSAY OF TOTAL TESTOSTERONE: CPT

## 2025-06-13 PROCEDURE — 83001 ASSAY OF GONADOTROPIN (FSH): CPT

## 2025-06-13 PROCEDURE — 77063 BREAST TOMOSYNTHESIS BI: CPT

## 2025-06-14 LAB
25(OH)D3 SERPL-MCNC: 38.1 NG/ML (ref 30–100)
ALBUMIN SERPL-MCNC: 4.6 G/DL (ref 3.5–5.2)
ALBUMIN/GLOB SERPL: 1.5 G/DL
ALP SERPL-CCNC: 83 U/L (ref 39–117)
ALT SERPL W P-5'-P-CCNC: 31 U/L (ref 1–33)
ANION GAP SERPL CALCULATED.3IONS-SCNC: 12 MMOL/L (ref 5–15)
AST SERPL-CCNC: 24 U/L (ref 1–32)
BILIRUB SERPL-MCNC: 0.3 MG/DL (ref 0–1.2)
BUN SERPL-MCNC: 14 MG/DL (ref 6–20)
BUN/CREAT SERPL: 16.7 (ref 7–25)
CALCIUM SPEC-SCNC: 10 MG/DL (ref 8.6–10.5)
CHLORIDE SERPL-SCNC: 106 MMOL/L (ref 98–107)
CO2 SERPL-SCNC: 23 MMOL/L (ref 22–29)
CREAT SERPL-MCNC: 0.84 MG/DL (ref 0.57–1)
EGFRCR SERPLBLD CKD-EPI 2021: 84.8 ML/MIN/1.73
ESTRADIOL SERPL HS-MCNC: <5 PG/ML
FSH SERPL-ACNC: 53.9 MIU/ML
GLOBULIN UR ELPH-MCNC: 3 GM/DL
GLUCOSE SERPL-MCNC: 90 MG/DL (ref 65–99)
LH SERPL-ACNC: 31.2 MIU/ML
POTASSIUM SERPL-SCNC: 4.1 MMOL/L (ref 3.5–5.2)
PROGEST SERPL-MCNC: 0.12 NG/ML
PROLACTIN SERPL-MCNC: 15.5 NG/ML (ref 4.79–23.3)
PROT SERPL-MCNC: 7.6 G/DL (ref 6–8.5)
SODIUM SERPL-SCNC: 141 MMOL/L (ref 136–145)
T4 FREE SERPL-MCNC: 1.1 NG/DL (ref 0.92–1.68)
TSH SERPL DL<=0.05 MIU/L-ACNC: 1.52 UIU/ML (ref 0.27–4.2)

## 2025-06-15 LAB — DHEA-S SERPL-MCNC: 142 UG/DL (ref 41.2–243.7)

## 2025-06-18 LAB
TESTOST FREE SERPL-MCNC: 1.5 PG/ML (ref 0–4.2)
TESTOST SERPL-MCNC: 13 NG/DL (ref 4–50)

## 2025-06-19 ENCOUNTER — OFFICE VISIT (OUTPATIENT)
Dept: OBSTETRICS AND GYNECOLOGY | Facility: CLINIC | Age: 50
End: 2025-06-19
Payer: COMMERCIAL

## 2025-06-19 VITALS
DIASTOLIC BLOOD PRESSURE: 72 MMHG | HEIGHT: 65 IN | WEIGHT: 221 LBS | BODY MASS INDEX: 36.82 KG/M2 | SYSTOLIC BLOOD PRESSURE: 130 MMHG

## 2025-06-19 DIAGNOSIS — N95.1 MENOPAUSAL SYMPTOMS: ICD-10-CM

## 2025-06-19 DIAGNOSIS — Z01.411 ENCOUNTER FOR GYNECOLOGICAL EXAMINATION (GENERAL) (ROUTINE) WITH ABNORMAL FINDINGS: Primary | ICD-10-CM

## 2025-06-19 DIAGNOSIS — R51.9 FREQUENT HEADACHES: ICD-10-CM

## 2025-06-19 DIAGNOSIS — Z12.31 ENCOUNTER FOR SCREENING MAMMOGRAM FOR BREAST CANCER: ICD-10-CM

## 2025-06-19 DIAGNOSIS — N95.2 POSTMENOPAUSAL ATROPHIC VAGINITIS: ICD-10-CM

## 2025-06-19 LAB — ESTROGEN SERPL-MCNC: 61 PG/ML

## 2025-06-19 NOTE — PROGRESS NOTES
Chief Complaint  Gynecologic Exam     History of Present Illness:  Patient is 50 y.o.  who presents to [facility] here for her annual examination.  Patient had her last Pap smear in .  Patient has had a previous hysterectomy.  She still has her ovaries.  She has had substantial worsening of menopausal symptoms over the last year.  She reports having hot flashes approximately every 30 minutes during the day.  She has night sweats as well.  She is also been having mood swings.  She has tried black cohosh.  She did have initial improvement in her symptoms for several months.  Her symptoms however have worsened.  She was recently started on Neurontin for neuropathy.  She is in the process of seeing a neurologist for her neuropathy as well as frequent headaches.  She sees Dr. Rea for her primary care.  She did have recent labs including hormone levels.  She did have a recent mammogram as well.  She had a colonoscopy 4 years ago.    History  Past Medical History:   Diagnosis Date    Abnormal ECG     Bystolic    Arrhythmia     Fibroid 2011    removed via hysterectomy    Fracture of ankle 24    GERD (gastroesophageal reflux disease)     Heart murmur Birth    High cholesterol     History of Holter monitoring     48 HOUR SEVERAL YEARS AGO    Migraine 2021    tried medication    PMS (premenstrual syndrome) 1986    [facility] - Hysterectomy cured    PONV (postoperative nausea and vomiting)     Tennis elbow 2018    Varicella 1982     Current Outpatient Medications on File Prior to Visit   Medication Sig Dispense Refill    Coenzyme Q10 (COQ-10 PO) Take  by mouth Every Night. GUMMIE      Diclofenac Sodium (VOLTAREN) 1 % gel gel Apply 4 grams topically to the appropriate area as directed 4 (Four) Times a Day As Needed (right foot and ankle). 150 g 2    ergocalciferol (ERGOCALCIFEROL) 1.25 MG (61161 UT) capsule Take 1 capsule by mouth 1 (One) Time Per Week. 13 capsule  3    famotidine (PEPCID) 40 MG tablet Take 1 tablet by mouth 2 (Two) Times a Day. 180 tablet 3    gabapentin (NEURONTIN) 100 MG capsule Take 1 capsule (100 mg) by mouth 3 times per day. 90 capsule 0    ibuprofen (ADVIL,MOTRIN) 600 MG tablet Take 1 tablet by mouth Every 8 (Eight) Hours. Patient has med at home already. Should take scheduled every 8 hours with food until follow up in 2 weeks. 90 tablet 1    multivitamin with minerals (Hair Skin and Nails Formula) tablet tablet Take 1 tablet by mouth Daily. GUMMIE AT NIGHT      nebivolol (BYSTOLIC) 5 MG tablet Take 1 tablet by mouth Daily. 90 tablet 3    pitavastatin calcium (Livalo) 2 MG tablet tablet Take 1 tablet by mouth Daily. 90 tablet 3    vitamin D (ERGOCALCIFEROL) 1.25 MG (45564 UT) capsule capsule Take 1 capsule by mouth weekly 12 capsule 3     No current facility-administered medications on file prior to visit.     Allergies   Allergen Reactions    Penicillins Other (See Comments)     CHILDHOOD     Past Surgical History:   Procedure Laterality Date    CHOLECYSTECTOMY      COLONOSCOPY  08/11/2021    Dr GermainKalen    HYSTERECTOMY      LAPAROSCOPIC CHOLECYSTECTOMY  2003    VAGINAL HYSTERECTOMY  08/2011    WISDOM TOOTH EXTRACTION  1993     Family History   Problem Relation Age of Onset    Cancer Mother     Osteoarthritis Mother     Hypertension Mother     Deep vein thrombosis Mother     Pulmonary embolism Mother     Stroke Mother     Osteoporosis Mother     Asthma Mother     Broken bones Mother     Cancer Father     Hypertension Father     Colon cancer Father     Heart attack Father     Hyperlipidemia Father     Gout Maternal Grandfather     Cancer Maternal Grandfather     Diabetes Maternal Grandfather     Hypertension Maternal Grandfather     Heart attack Maternal Grandfather     Hyperlipidemia Maternal Grandfather     Diabetes Paternal Grandfather     Breast cancer Neg Hx      Social History     Socioeconomic History    Marital status:    Tobacco Use  "   Smoking status: Never     Passive exposure: Past (Both parents smoked growing up)    Smokeless tobacco: Never   Vaping Use    Vaping status: Never Used   Substance and Sexual Activity    Alcohol use: Yes     Alcohol/week: 2.0 standard drinks of alcohol     Types: 2 Drinks containing 0.5 oz of alcohol per week     Comment: 1-2/week    Drug use: No    Sexual activity: Yes     Partners: Male     Birth control/protection: Surgical     Comment: Hysterectomy       Physical Examination:  Vital Signs: /72   Ht 165.1 cm (65\")   Wt 100 kg (221 lb)   BMI 36.78 kg/m²     General Appearance: alert, appears stated age, and cooperative  Breasts: Examined in supine position  Symmetric without masses or skin dimpling  Nipples normal without inversion, lesions or discharge  There are no palpable axillary nodes  Abdomen: no masses, no hepatomegaly, no splenomegaly, soft non-tender, no guarding, and no rebound tenderness  Pelvic: Clinical staff was present for exam; pelvic examination was required for evaluation  External genitalia:  normal appearance of the external genitalia including Bartholin's and [place]'s glands.  :  urethral meatus normal;  Vaginal:  atrophic mucosal changes are present;  Cervix:  absent.  Uterus:  absent.  Adnexa:  non palpable bilaterally.  Pap smear done and specimen sent using Thin-Prep technique    Data Review:  The following data was reviewed by: Cherelle Carrera MD on 06/19/2025:     Labs:  T4, Free (06/13/2025 11:32)  Prolactin (06/13/2025 11:32)  DHEA-Sulfate (06/13/2025 11:32)  Testosterone, Free, Total (06/13/2025 11:32)  Estradiol (06/13/2025 11:32)  Estrogens, Total (06/13/2025 11:32)  Progesterone (06/13/2025 11:32)  FSH & LH (06/13/2025 11:32)  TSH (06/13/2025 11:32)  Vitamin D 25 Hydroxy (06/13/2025 11:32)  Imaging:  Mammo Screening Digital Tomosynthesis Bilateral With CAD (06/13/2025 11:14)   Medical Records:  PROGRESS NOTES - SCAN by [employer], [place] (05/20/2025 00:00) "     Assessment and Plan   1. Encounter for gynecological examination (general) (routine) with abnormal findings  Pap was done today.  If she does not receive the results of the Pap within 2 weeks  time, she was instructed to call to find out the results.  I explained to Rosie that the recommendations for Pap smear interval in a low risk patient has lengthened to 3 years time if cytology alone normal or  5 years time if both cytology and HPV testing were normal.  I encouraged her to be seen yearly for a full physical exam including breast and pelvic exam even during the off years when PAP's will not be performed.   - LIQUID-BASED PAP SMEAR WITH HPV GENOTYPING IF ASCUS ([facility])    2. Encounter for screening mammogram for breast cancer  It is recommended per ACOG, for women at average risk to start annual mammogram screening at the age of 40 until the age of 75 and an individualized decision be made for women after age 75.  She was encouraged to continue getting yearly mammograms.  She should report any palpable breast lump(s) or skin changes regardless of mammographic findings.  I explained to Rosie that notification regarding her mammogram results will come from the center performing the study.  Our office will not be routinely calling with mammogram results.  It is her responsibility to make sure that the results from the mammogram are communicated to her by the breast center.  If she has any questions about the results, she is welcome to call our office anytime.  The patient reports she has done her mammogram and results are noted.    Rosie was counseled regarding having clinical breast exams and breast self-awareness.  Women aged 29-39 years of age should have clinical breast exams every 1-3 years and yearly aged 40 and older.  The patient was counseled regarding breast self-awareness focusing on having a sense of what is normal for her breasts so that she can tell if there are changes.  Even small  changes should be reported to provider.     3. Menopausal symptoms  The various options for the management of menopausal symptoms was discussed.  The medical treatment options discussed include HRT, SSRIs, SSNRIs, clonidine, and gabapentin.  The risks and benefits were discussed including the findings from the WHI study.  The increased risk of breast cancer, CAD, stroke, and VTE events were discussed for combination therapy vs the increased risk of CV events and breast cancer not being seen in the estrogen only group.  The lowest effective dose for the shortest duration of treatment was discussed in regards to HRT.  Other alternatives including otc supplements and lifestyle changes were also discussed.  Local estrogen therapy to relieve atrophic vaginal symptoms was discussed was well as other alternatives.  Labs reviewed as noted.  Patient is in the menopausal range.  I have discussed with the patient at length the risk versus benefits of systemic hormone replacement therapy.  Have also discussed with the patient other alternatives for management of her symptoms.  Patient is currently on Neurontin 100 mg daily.  Patient to continue her Neurontin.  Recommend patient discontinue her black cohosh.  Patient can consider a trial with Estroven.  Patient to call if no improvement and/or worsening of her symptoms in the next 4 to 6 weeks.  I would not recommend systemic hormone replacement therapy until patient has an evaluation with urology.    4. Postmenopausal atrophic vaginitis  Discussed various options for relief of atrophic vaginal symptoms related to menopause. Discussed local therapy for treatment of vaginal symptoms only.  Discussed the different formulation options including cream, ring, and tablets.  Discussed the low risk of systemic absorption in postmenopausal women with atrophy using 25 mcgs of estradiol on a daily basis.  Recommend low dose use 2-3x/wk for maintenance of treatment.  Other treatment options  were discussed including the use of water-based and silicone-based vaginal lubricants and moisturizers.  Also discussed was the FDA approved treatment option of ospemifene for moderate to severe dyspareunia.   Pt to call if she desires treatment.    5. Frequent headaches  Patient with frequent headaches as noted.  I would not recommend systemic hormone replacement therapy at present.  Recommend patient follow-up with neurology for further evaluation.  I have discussed with the patient the risk versus benefits of systemic hormone replacement therapy.    Follow Up/Instructions:  Follow up as noted.  Patient was given instructions and counseling regarding her condition or for health maintenance advice. Please see specific information pulled into the AVS if appropriate.     Note: Speech recognition transcription software may have been used to dictate portions of this document.  An attempt at proofreading has been made though minor errors in transcription may still be present.    This note was electronically signed.  Cherelle Carrera M.D.

## 2025-06-23 LAB — REF LAB TEST METHOD: NORMAL

## 2025-07-16 ENCOUNTER — TRANSCRIBE ORDERS (OUTPATIENT)
Dept: LAB | Facility: HOSPITAL | Age: 50
End: 2025-07-16
Payer: COMMERCIAL

## 2025-07-16 DIAGNOSIS — R23.2 FLUSHING: Primary | ICD-10-CM
